# Patient Record
Sex: MALE | Race: WHITE | NOT HISPANIC OR LATINO | URBAN - METROPOLITAN AREA
[De-identification: names, ages, dates, MRNs, and addresses within clinical notes are randomized per-mention and may not be internally consistent; named-entity substitution may affect disease eponyms.]

---

## 2023-04-18 ENCOUNTER — OFFICE VISIT (OUTPATIENT)
Dept: PRIMARY CARE | Facility: CLINIC | Age: 68
End: 2023-04-18
Payer: MEDICARE

## 2023-04-18 VITALS
DIASTOLIC BLOOD PRESSURE: 72 MMHG | BODY MASS INDEX: 32.95 KG/M2 | HEIGHT: 66 IN | HEART RATE: 67 BPM | OXYGEN SATURATION: 96 % | SYSTOLIC BLOOD PRESSURE: 121 MMHG | WEIGHT: 205 LBS

## 2023-04-18 DIAGNOSIS — L98.9 SKIN LESION: ICD-10-CM

## 2023-04-18 DIAGNOSIS — E78.5 HYPERLIPIDEMIA, UNSPECIFIED HYPERLIPIDEMIA TYPE: ICD-10-CM

## 2023-04-18 DIAGNOSIS — E11.69 TYPE 2 DIABETES MELLITUS WITH OTHER SPECIFIED COMPLICATION, WITH LONG-TERM CURRENT USE OF INSULIN (MULTI): Primary | ICD-10-CM

## 2023-04-18 DIAGNOSIS — Z95.1 HX OF CABG: ICD-10-CM

## 2023-04-18 DIAGNOSIS — K59.00 CONSTIPATION, UNSPECIFIED CONSTIPATION TYPE: ICD-10-CM

## 2023-04-18 DIAGNOSIS — Z79.01 ANTICOAGULATED: ICD-10-CM

## 2023-04-18 DIAGNOSIS — J44.9 CHRONIC OBSTRUCTIVE PULMONARY DISEASE, UNSPECIFIED COPD TYPE (MULTI): ICD-10-CM

## 2023-04-18 DIAGNOSIS — Z79.4 TYPE 2 DIABETES MELLITUS WITH OTHER SPECIFIED COMPLICATION, WITH LONG-TERM CURRENT USE OF INSULIN (MULTI): Primary | ICD-10-CM

## 2023-04-18 DIAGNOSIS — R53.83 OTHER FATIGUE: ICD-10-CM

## 2023-04-18 DIAGNOSIS — I10 HYPERTENSION, UNSPECIFIED TYPE: ICD-10-CM

## 2023-04-18 DIAGNOSIS — E66.9 CLASS 1 OBESITY WITH SERIOUS COMORBIDITY AND BODY MASS INDEX (BMI) OF 33.0 TO 33.9 IN ADULT, UNSPECIFIED OBESITY TYPE: ICD-10-CM

## 2023-04-18 DIAGNOSIS — I48.0 PAROXYSMAL ATRIAL FIBRILLATION (MULTI): ICD-10-CM

## 2023-04-18 DIAGNOSIS — Z87.891 PERSONAL HISTORY OF NICOTINE DEPENDENCE: ICD-10-CM

## 2023-04-18 DIAGNOSIS — G47.33 OSA ON CPAP: ICD-10-CM

## 2023-04-18 LAB
ALANINE AMINOTRANSFERASE (SGPT) (U/L) IN SER/PLAS: 40 U/L (ref 10–52)
ALBUMIN (G/DL) IN SER/PLAS: 4.4 G/DL (ref 3.4–5)
ALBUMIN (MG/L) IN URINE: 41.9 MG/L
ALBUMIN/CREATININE (UG/MG) IN URINE: 29.5 UG/MG CRT (ref 0–30)
ALKALINE PHOSPHATASE (U/L) IN SER/PLAS: 60 U/L (ref 33–136)
ANION GAP IN SER/PLAS: 11 MMOL/L (ref 10–20)
ASPARTATE AMINOTRANSFERASE (SGOT) (U/L) IN SER/PLAS: 38 U/L (ref 9–39)
BILIRUBIN TOTAL (MG/DL) IN SER/PLAS: 1.1 MG/DL (ref 0–1.2)
CALCIUM (MG/DL) IN SER/PLAS: 9.8 MG/DL (ref 8.6–10.6)
CARBON DIOXIDE, TOTAL (MMOL/L) IN SER/PLAS: 34 MMOL/L (ref 21–32)
CHLORIDE (MMOL/L) IN SER/PLAS: 98 MMOL/L (ref 98–107)
CHOLESTEROL (MG/DL) IN SER/PLAS: 129 MG/DL (ref 0–199)
CHOLESTEROL IN HDL (MG/DL) IN SER/PLAS: 44.8 MG/DL
CHOLESTEROL/HDL RATIO: 2.9
CREATININE (MG/DL) IN SER/PLAS: 1.1 MG/DL (ref 0.5–1.3)
CREATININE (MG/DL) IN URINE: 142 MG/DL (ref 20–370)
ERYTHROCYTE DISTRIBUTION WIDTH (RATIO) BY AUTOMATED COUNT: 14.5 % (ref 11.5–14.5)
ERYTHROCYTE MEAN CORPUSCULAR HEMOGLOBIN CONCENTRATION (G/DL) BY AUTOMATED: 31.7 G/DL (ref 32–36)
ERYTHROCYTE MEAN CORPUSCULAR VOLUME (FL) BY AUTOMATED COUNT: 98 FL (ref 80–100)
ERYTHROCYTES (10*6/UL) IN BLOOD BY AUTOMATED COUNT: 5.94 X10E12/L (ref 4.5–5.9)
ESTIMATED AVERAGE GLUCOSE FOR HBA1C: 151 MG/DL
FERRITIN (UG/LL) IN SER/PLAS: 62 UG/L (ref 20–300)
GFR MALE: 73 ML/MIN/1.73M2
GLUCOSE (MG/DL) IN SER/PLAS: 102 MG/DL (ref 74–99)
HEMATOCRIT (%) IN BLOOD BY AUTOMATED COUNT: 58 % (ref 41–52)
HEMOGLOBIN (G/DL) IN BLOOD: 18.4 G/DL (ref 13.5–17.5)
HEMOGLOBIN A1C/HEMOGLOBIN TOTAL IN BLOOD: 6.9 %
IRON (UG/DL) IN SER/PLAS: 114 UG/DL (ref 35–150)
IRON BINDING CAPACITY (UG/DL) IN SER/PLAS: 438 UG/DL (ref 240–445)
IRON SATURATION (%) IN SER/PLAS: 26 % (ref 25–45)
LDL: 46 MG/DL (ref 0–99)
LEUKOCYTES (10*3/UL) IN BLOOD BY AUTOMATED COUNT: 9.7 X10E9/L (ref 4.4–11.3)
NRBC (PER 100 WBCS) BY AUTOMATED COUNT: 0 /100 WBC (ref 0–0)
PLATELETS (10*3/UL) IN BLOOD AUTOMATED COUNT: 235 X10E9/L (ref 150–450)
POTASSIUM (MMOL/L) IN SER/PLAS: 4.8 MMOL/L (ref 3.5–5.3)
PROTEIN TOTAL: 7.3 G/DL (ref 6.4–8.2)
SODIUM (MMOL/L) IN SER/PLAS: 138 MMOL/L (ref 136–145)
THYROTROPIN (MIU/L) IN SER/PLAS BY DETECTION LIMIT <= 0.05 MIU/L: 3.47 MIU/L (ref 0.44–3.98)
TRANSFERRIN (MG/DL) IN SER/PLAS: 330 MG/DL (ref 200–360)
TRIGLYCERIDE (MG/DL) IN SER/PLAS: 189 MG/DL (ref 0–149)
UREA NITROGEN (MG/DL) IN SER/PLAS: 15 MG/DL (ref 6–23)
VLDL: 38 MG/DL (ref 0–40)

## 2023-04-18 PROCEDURE — 3008F BODY MASS INDEX DOCD: CPT | Performed by: INTERNAL MEDICINE

## 2023-04-18 PROCEDURE — 1159F MED LIST DOCD IN RCRD: CPT | Performed by: INTERNAL MEDICINE

## 2023-04-18 PROCEDURE — 83036 HEMOGLOBIN GLYCOSYLATED A1C: CPT

## 2023-04-18 PROCEDURE — 85027 COMPLETE CBC AUTOMATED: CPT

## 2023-04-18 PROCEDURE — 84466 ASSAY OF TRANSFERRIN: CPT

## 2023-04-18 PROCEDURE — 80061 LIPID PANEL: CPT

## 2023-04-18 PROCEDURE — 1160F RVW MEDS BY RX/DR IN RCRD: CPT | Performed by: INTERNAL MEDICINE

## 2023-04-18 PROCEDURE — 3078F DIAST BP <80 MM HG: CPT | Performed by: INTERNAL MEDICINE

## 2023-04-18 PROCEDURE — 82570 ASSAY OF URINE CREATININE: CPT

## 2023-04-18 PROCEDURE — 80053 COMPREHEN METABOLIC PANEL: CPT

## 2023-04-18 PROCEDURE — 36415 COLL VENOUS BLD VENIPUNCTURE: CPT | Performed by: INTERNAL MEDICINE

## 2023-04-18 PROCEDURE — 82043 UR ALBUMIN QUANTITATIVE: CPT

## 2023-04-18 PROCEDURE — 83540 ASSAY OF IRON: CPT

## 2023-04-18 PROCEDURE — 82728 ASSAY OF FERRITIN: CPT

## 2023-04-18 PROCEDURE — 1036F TOBACCO NON-USER: CPT | Performed by: INTERNAL MEDICINE

## 2023-04-18 PROCEDURE — 99204 OFFICE O/P NEW MOD 45 MIN: CPT | Performed by: INTERNAL MEDICINE

## 2023-04-18 PROCEDURE — 84443 ASSAY THYROID STIM HORMONE: CPT

## 2023-04-18 PROCEDURE — 3074F SYST BP LT 130 MM HG: CPT | Performed by: INTERNAL MEDICINE

## 2023-04-18 RX ORDER — NEBIVOLOL 5 MG/1
5 TABLET ORAL DAILY
COMMUNITY
End: 2024-04-25 | Stop reason: ALTCHOICE

## 2023-04-18 RX ORDER — METFORMIN HYDROCHLORIDE 500 MG/1
500 TABLET ORAL
COMMUNITY
End: 2023-08-30 | Stop reason: SDUPTHER

## 2023-04-18 RX ORDER — CALCIUM CARBONATE 300MG(750)
400 TABLET,CHEWABLE ORAL DAILY
COMMUNITY

## 2023-04-18 RX ORDER — ALBUTEROL SULFATE 90 UG/1
2 AEROSOL, METERED RESPIRATORY (INHALATION) EVERY 6 HOURS PRN
COMMUNITY

## 2023-04-18 RX ORDER — DABIGATRAN ETEXILATE 150 MG/1
150 CAPSULE ORAL 2 TIMES DAILY
COMMUNITY
End: 2024-04-25 | Stop reason: ALTCHOICE

## 2023-04-18 RX ORDER — AMIODARONE HYDROCHLORIDE 200 MG/1
200 TABLET ORAL DAILY
COMMUNITY
End: 2024-04-25 | Stop reason: ALTCHOICE

## 2023-04-18 RX ORDER — LATANOPROST 50 UG/ML
1 SOLUTION/ DROPS OPHTHALMIC NIGHTLY
COMMUNITY

## 2023-04-18 RX ORDER — FUROSEMIDE 20 MG/1
TABLET ORAL
COMMUNITY

## 2023-04-18 RX ORDER — LINAGLIPTIN 5 MG/1
5 TABLET, FILM COATED ORAL DAILY
COMMUNITY
End: 2023-08-30 | Stop reason: SDUPTHER

## 2023-04-18 RX ORDER — VITAMIN E MIXED 400 UNIT
CAPSULE ORAL DAILY
COMMUNITY
End: 2023-09-05

## 2023-04-18 RX ORDER — ROSUVASTATIN CALCIUM 20 MG/1
20 TABLET, COATED ORAL DAILY
COMMUNITY
End: 2023-08-30 | Stop reason: SDUPTHER

## 2023-04-18 RX ORDER — ASPIRIN 81 MG/1
81 TABLET ORAL DAILY
COMMUNITY

## 2023-04-18 RX ORDER — TIOTROPIUM BROMIDE 18 UG/1
1 CAPSULE ORAL; RESPIRATORY (INHALATION)
COMMUNITY
End: 2024-03-06 | Stop reason: SDUPTHER

## 2023-04-18 RX ORDER — MULTIVITAMIN
1 TABLET ORAL DAILY
COMMUNITY

## 2023-04-18 NOTE — PROGRESS NOTES
"Subjective   Patient ID: Augusto Woodward is a 68 y.o. male who presents for Establish Care (New patient here to hospitals care).    HPI   Presents with C/O chronic fatigue, on CPAP for MAC, tolerating it well.  C/O skin lesion on the upper abdominal wall, recent eruption, non tender.   Reports history of CABG, atrial fibrillation, COPD, 30 pack year smoking history, quit 11 years prior.   Review of Systems  Fatigue   Skin lesion  Constipation    Objective   /72   Pulse 67   Ht 1.676 m (5' 6\")   Wt 93 kg (205 lb)   SpO2 96%   BMI 33.09 kg/m²     Physical Exam  NAD. Cooperative.  Skin: palpable papule, erythematous, non tender  Lungs CTA  Heart: RRR  LE: negative     Assessment/Plan   Diagnoses and all orders for this visit:  Type 2 diabetes mellitus with other specified complication, with long-term current use of insulin (CMS/HCC)  -     Albumin, urine, random; Future  -     Hemoglobin A1C; Future  Hypertension, unspecified type  -     Comprehensive metabolic panel; Future  Hyperlipidemia, unspecified hyperlipidemia type  -     Lipid panel; Future  -     TSH; Future  Other fatigue  -     CBC; Future  -     Iron and TIBC; Future  -     Ferritin; Future  -     Transferrin; Future  -     TSH; Future  Skin lesion  -     Referral to Dermatology; Future  Paroxysmal atrial fibrillation (CMS/HCC)  Constipation, unspecified constipation type  Anticoagulated  Chronic obstructive pulmonary disease, unspecified COPD type (CMS/HCC)  Personal history of nicotine dependence  -     CT lung screening low dose; Future  Class 1 obesity with serious comorbidity and body mass index (BMI) of 33.0 to 33.9 in adult, unspecified obesity type  MAC on CPAP  Hx of CABG  Discussed health benefits from whole food plant based diet. Bibliotherapy: [employer], ARISTEO Holloway, PhD, suggested         "

## 2023-04-19 PROBLEM — R80.9 ALBUMINURIA: Status: ACTIVE | Noted: 2023-04-19

## 2023-04-20 ENCOUNTER — TELEPHONE (OUTPATIENT)
Dept: PRIMARY CARE | Facility: CLINIC | Age: 68
End: 2023-04-20
Payer: MEDICARE

## 2023-08-30 DIAGNOSIS — E78.5 HYPERLIPIDEMIA, UNSPECIFIED HYPERLIPIDEMIA TYPE: ICD-10-CM

## 2023-08-30 DIAGNOSIS — E11.69 TYPE 2 DIABETES MELLITUS WITH OTHER SPECIFIED COMPLICATION, WITH LONG-TERM CURRENT USE OF INSULIN (MULTI): ICD-10-CM

## 2023-08-30 DIAGNOSIS — Z79.4 TYPE 2 DIABETES MELLITUS WITH OTHER SPECIFIED COMPLICATION, WITH LONG-TERM CURRENT USE OF INSULIN (MULTI): ICD-10-CM

## 2023-08-30 RX ORDER — METFORMIN HYDROCHLORIDE 500 MG/1
500 TABLET ORAL
Qty: 180 TABLET | Refills: 1 | Status: SHIPPED | OUTPATIENT
Start: 2023-08-30 | End: 2023-09-05

## 2023-08-30 RX ORDER — LINAGLIPTIN 5 MG/1
5 TABLET, FILM COATED ORAL DAILY
Qty: 90 TABLET | Refills: 1 | Status: SHIPPED | OUTPATIENT
Start: 2023-08-30 | End: 2023-11-27

## 2023-08-30 RX ORDER — ROSUVASTATIN CALCIUM 20 MG/1
20 TABLET, COATED ORAL DAILY
Qty: 90 TABLET | Refills: 1 | Status: SHIPPED | OUTPATIENT
Start: 2023-08-30 | End: 2023-11-06

## 2023-09-01 ENCOUNTER — TELEPHONE (OUTPATIENT)
Dept: PRIMARY CARE | Facility: CLINIC | Age: 68
End: 2023-09-01
Payer: MEDICARE

## 2023-09-05 DIAGNOSIS — E11.9 TYPE 2 DIABETES MELLITUS WITHOUT COMPLICATION, WITHOUT LONG-TERM CURRENT USE OF INSULIN (MULTI): Primary | ICD-10-CM

## 2023-09-05 PROBLEM — E78.49 OTHER HYPERLIPIDEMIA: Status: ACTIVE | Noted: 2023-09-05

## 2023-09-05 RX ORDER — METFORMIN HYDROCHLORIDE 500 MG/1
1000 TABLET, EXTENDED RELEASE ORAL
Qty: 180 TABLET | Refills: 3 | Status: SHIPPED | OUTPATIENT
Start: 2023-09-05 | End: 2024-09-04

## 2023-10-17 ENCOUNTER — OFFICE VISIT (OUTPATIENT)
Dept: PRIMARY CARE | Facility: CLINIC | Age: 68
End: 2023-10-17
Payer: MEDICARE

## 2023-10-17 VITALS
SYSTOLIC BLOOD PRESSURE: 140 MMHG | HEART RATE: 64 BPM | BODY MASS INDEX: 32.15 KG/M2 | HEIGHT: 65 IN | DIASTOLIC BLOOD PRESSURE: 63 MMHG | WEIGHT: 193 LBS | OXYGEN SATURATION: 97 %

## 2023-10-17 DIAGNOSIS — K59.00 CONSTIPATION, UNSPECIFIED CONSTIPATION TYPE: ICD-10-CM

## 2023-10-17 DIAGNOSIS — R80.9 ALBUMINURIA: ICD-10-CM

## 2023-10-17 DIAGNOSIS — Z00.00 MEDICARE ANNUAL WELLNESS VISIT, SUBSEQUENT: Primary | ICD-10-CM

## 2023-10-17 DIAGNOSIS — Z12.5 SCREENING PSA (PROSTATE SPECIFIC ANTIGEN): ICD-10-CM

## 2023-10-17 DIAGNOSIS — E11.9 TYPE 2 DIABETES MELLITUS WITHOUT COMPLICATION, WITHOUT LONG-TERM CURRENT USE OF INSULIN (MULTI): ICD-10-CM

## 2023-10-17 DIAGNOSIS — R71.0 HEMOGLOBIN DROP: ICD-10-CM

## 2023-10-17 DIAGNOSIS — R03.0 ELEVATED BP WITHOUT DIAGNOSIS OF HYPERTENSION: ICD-10-CM

## 2023-10-17 DIAGNOSIS — Z23 IMMUNIZATION DUE: ICD-10-CM

## 2023-10-17 DIAGNOSIS — E78.49 OTHER HYPERLIPIDEMIA: ICD-10-CM

## 2023-10-17 DIAGNOSIS — Z12.11 ENCOUNTER FOR SCREENING FOR MALIGNANT NEOPLASM OF COLON: ICD-10-CM

## 2023-10-17 DIAGNOSIS — M70.41 PREPATELLAR BURSITIS OF RIGHT KNEE: ICD-10-CM

## 2023-10-17 DIAGNOSIS — Z11.59 NEED FOR HEPATITIS C SCREENING TEST: ICD-10-CM

## 2023-10-17 DIAGNOSIS — R79.89 ABNORMAL TSH: Primary | ICD-10-CM

## 2023-10-17 LAB
ALBUMIN SERPL BCP-MCNC: 4.5 G/DL (ref 3.4–5)
ALP SERPL-CCNC: 71 U/L (ref 33–136)
ALT SERPL W P-5'-P-CCNC: 31 U/L (ref 10–52)
ANION GAP SERPL CALC-SCNC: 12 MMOL/L (ref 10–20)
AST SERPL W P-5'-P-CCNC: 24 U/L (ref 9–39)
BILIRUB SERPL-MCNC: 1 MG/DL (ref 0–1.2)
BUN SERPL-MCNC: 12 MG/DL (ref 6–23)
CALCIUM SERPL-MCNC: 9.9 MG/DL (ref 8.6–10.6)
CHLORIDE SERPL-SCNC: 101 MMOL/L (ref 98–107)
CHOLEST SERPL-MCNC: 129 MG/DL (ref 0–199)
CHOLESTEROL/HDL RATIO: 2.6
CO2 SERPL-SCNC: 31 MMOL/L (ref 21–32)
CREAT SERPL-MCNC: 0.85 MG/DL (ref 0.5–1.3)
ERYTHROCYTE [DISTWIDTH] IN BLOOD BY AUTOMATED COUNT: 14.8 % (ref 11.5–14.5)
EST. AVERAGE GLUCOSE BLD GHB EST-MCNC: 134 MG/DL
GFR SERPL CREATININE-BSD FRML MDRD: >90 ML/MIN/1.73M*2
GLUCOSE SERPL-MCNC: 97 MG/DL (ref 74–99)
HBA1C MFR BLD: 6.3 %
HCT VFR BLD AUTO: 53.6 % (ref 41–52)
HCV AB SER QL: NONREACTIVE
HDLC SERPL-MCNC: 48.7 MG/DL
HGB BLD-MCNC: 17.4 G/DL (ref 13.5–17.5)
LDLC SERPL CALC-MCNC: 51 MG/DL
MCH RBC QN AUTO: 31.4 PG (ref 26–34)
MCHC RBC AUTO-ENTMCNC: 32.5 G/DL (ref 32–36)
MCV RBC AUTO: 97 FL (ref 80–100)
NON HDL CHOLESTEROL: 80 MG/DL (ref 0–149)
NRBC BLD-RTO: 0 /100 WBCS (ref 0–0)
PLATELET # BLD AUTO: 204 X10*3/UL (ref 150–450)
PMV BLD AUTO: 12.1 FL (ref 7.5–11.5)
POTASSIUM SERPL-SCNC: 4.4 MMOL/L (ref 3.5–5.3)
PROT SERPL-MCNC: 7.6 G/DL (ref 6.4–8.2)
PSA SERPL-MCNC: 0.79 NG/ML
RBC # BLD AUTO: 5.55 X10*6/UL (ref 4.5–5.9)
SODIUM SERPL-SCNC: 140 MMOL/L (ref 136–145)
TRIGL SERPL-MCNC: 147 MG/DL (ref 0–149)
TSH SERPL-ACNC: 0.06 MIU/L (ref 0.44–3.98)
VLDL: 29 MG/DL (ref 0–40)
WBC # BLD AUTO: 9.1 X10*3/UL (ref 4.4–11.3)

## 2023-10-17 PROCEDURE — G0439 PPPS, SUBSEQ VISIT: HCPCS | Performed by: INTERNAL MEDICINE

## 2023-10-17 PROCEDURE — 90662 IIV NO PRSV INCREASED AG IM: CPT | Performed by: INTERNAL MEDICINE

## 2023-10-17 PROCEDURE — 82570 ASSAY OF URINE CREATININE: CPT

## 2023-10-17 PROCEDURE — 1160F RVW MEDS BY RX/DR IN RCRD: CPT | Performed by: INTERNAL MEDICINE

## 2023-10-17 PROCEDURE — 1159F MED LIST DOCD IN RCRD: CPT | Performed by: INTERNAL MEDICINE

## 2023-10-17 PROCEDURE — 84443 ASSAY THYROID STIM HORMONE: CPT

## 2023-10-17 PROCEDURE — 3008F BODY MASS INDEX DOCD: CPT | Performed by: INTERNAL MEDICINE

## 2023-10-17 PROCEDURE — 86803 HEPATITIS C AB TEST: CPT

## 2023-10-17 PROCEDURE — 3078F DIAST BP <80 MM HG: CPT | Performed by: INTERNAL MEDICINE

## 2023-10-17 PROCEDURE — G0008 ADMIN INFLUENZA VIRUS VAC: HCPCS | Performed by: INTERNAL MEDICINE

## 2023-10-17 PROCEDURE — 83036 HEMOGLOBIN GLYCOSYLATED A1C: CPT

## 2023-10-17 PROCEDURE — 82043 UR ALBUMIN QUANTITATIVE: CPT

## 2023-10-17 PROCEDURE — 90677 PCV20 VACCINE IM: CPT | Performed by: INTERNAL MEDICINE

## 2023-10-17 PROCEDURE — 1170F FXNL STATUS ASSESSED: CPT | Performed by: INTERNAL MEDICINE

## 2023-10-17 PROCEDURE — 80053 COMPREHEN METABOLIC PANEL: CPT

## 2023-10-17 PROCEDURE — 1036F TOBACCO NON-USER: CPT | Performed by: INTERNAL MEDICINE

## 2023-10-17 PROCEDURE — 84153 ASSAY OF PSA TOTAL: CPT

## 2023-10-17 PROCEDURE — 3044F HG A1C LEVEL LT 7.0%: CPT | Performed by: INTERNAL MEDICINE

## 2023-10-17 PROCEDURE — 36415 COLL VENOUS BLD VENIPUNCTURE: CPT

## 2023-10-17 PROCEDURE — 80061 LIPID PANEL: CPT

## 2023-10-17 PROCEDURE — 85027 COMPLETE CBC AUTOMATED: CPT

## 2023-10-17 PROCEDURE — 3077F SYST BP >= 140 MM HG: CPT | Performed by: INTERNAL MEDICINE

## 2023-10-17 PROCEDURE — G0009 ADMIN PNEUMOCOCCAL VACCINE: HCPCS | Performed by: INTERNAL MEDICINE

## 2023-10-17 ASSESSMENT — PATIENT HEALTH QUESTIONNAIRE - PHQ9
1. LITTLE INTEREST OR PLEASURE IN DOING THINGS: NOT AT ALL
SUM OF ALL RESPONSES TO PHQ9 QUESTIONS 1 AND 2: 0
2. FEELING DOWN, DEPRESSED OR HOPELESS: NOT AT ALL

## 2023-10-17 ASSESSMENT — ACTIVITIES OF DAILY LIVING (ADL)
DOING_HOUSEWORK: INDEPENDENT
MANAGING_FINANCES: INDEPENDENT
GROCERY_SHOPPING: INDEPENDENT
BATHING: INDEPENDENT
TAKING_MEDICATION: INDEPENDENT
DRESSING: INDEPENDENT

## 2023-10-17 NOTE — PROGRESS NOTES
"Subjective   Patient ID: Augusto Woodward is a 68 y.o. male who presents for Medicare Annual Wellness Visit Subsequent (Patient here for medicare wellness visit ).    HPI   C/O right knee pain and swelling, \"I fell 4 days ago\", tripped in his yard.    Review of Systems  constipation  Knee pain, swelling  Objective   /63   Pulse 64   Ht 1.65 m (5' 4.96\")   Wt 87.5 kg (193 lb)   SpO2 97%   BMI 32.16 kg/m²     Physical Exam  NAD. Cooperative.  HEENT: WNL  Neck: WNL  Lungs CTA  Heart: RRR  Abdomen: WNL  Musculoskeletal system: Knee exam reveals a mild prepatellar tenderness, palpable round nodule, ROM WNL,   Neurologic exam: WNL    Assessment/Plan   Diagnoses and all orders for this visit:  Medicare annual wellness visit, subsequent  -     Comprehensive metabolic panel; Future  Encounter for screening for malignant neoplasm of colon  -     Colonoscopy Screening; Future  -     CT cardiac scoring wo IV contrast; Future  Need for hepatitis C screening test  -     Hepatitis C Antibody; Future  Type 2 diabetes mellitus without complication, without long-term current use of insulin (CMS/[facility])  -     Hemoglobin A1C; Future  -     CT cardiac scoring wo IV contrast; Future  Other hyperlipidemia  -     TSH; Future  -     Lipid panel; Future  -     CT cardiac scoring wo IV contrast; Future  Albuminuria  -     Albumin, urine, random; Future  Hemoglobin drop  -     CBC; Future  Screening PSA (prostate specific antigen)  -     Prostate Spec.Ag,Screen; Future  Immunization due  -     Pneumococcal conjugate vaccine, 20-valent (PREVNAR 20)  -     Flu vaccine, quadrivalent, high-dose, preservative free, age 65y+ (FLUZONE)  Constipation, unspecified constipation type  Prepatellar bursitis of right knee  Elevated BP without diagnosis of hypertension  Recommended Tylenol 650 mg tid PRN, cold compress, PRN follow up.  BP monitoring, 3 months.  Weight reduction, DASH eating plan.  Recommended Lynda Lax OTC, hydration, dairy free " diet.  Discussed and recommended  RSV vaccine, COVID and Tdap boosters, postponed.

## 2023-10-18 LAB
CREAT UR-MCNC: 114.4 MG/DL (ref 20–370)
MICROALBUMIN UR-MCNC: 35.5 MG/L
MICROALBUMIN/CREAT UR: 31 UG/MG CREAT

## 2023-11-04 DIAGNOSIS — E78.5 HYPERLIPIDEMIA, UNSPECIFIED HYPERLIPIDEMIA TYPE: ICD-10-CM

## 2023-11-06 RX ORDER — ROSUVASTATIN CALCIUM 20 MG/1
20 TABLET, COATED ORAL DAILY
Qty: 100 TABLET | Refills: 3 | Status: SHIPPED | OUTPATIENT
Start: 2023-11-06

## 2023-11-23 DIAGNOSIS — Z79.4 TYPE 2 DIABETES MELLITUS WITH OTHER SPECIFIED COMPLICATION, WITH LONG-TERM CURRENT USE OF INSULIN (MULTI): ICD-10-CM

## 2023-11-23 DIAGNOSIS — E11.69 TYPE 2 DIABETES MELLITUS WITH OTHER SPECIFIED COMPLICATION, WITH LONG-TERM CURRENT USE OF INSULIN (MULTI): ICD-10-CM

## 2023-11-24 ENCOUNTER — LAB (OUTPATIENT)
Dept: LAB | Facility: LAB | Age: 68
End: 2023-11-24
Payer: MEDICARE

## 2023-11-24 DIAGNOSIS — R79.89 ABNORMAL TSH: ICD-10-CM

## 2023-11-24 LAB
T4 FREE SERPL-MCNC: 2.86 NG/DL (ref 0.78–1.48)
TSH SERPL-ACNC: 0.01 MIU/L (ref 0.44–3.98)

## 2023-11-24 PROCEDURE — 84443 ASSAY THYROID STIM HORMONE: CPT

## 2023-11-24 PROCEDURE — 84439 ASSAY OF FREE THYROXINE: CPT

## 2023-11-24 PROCEDURE — 36415 COLL VENOUS BLD VENIPUNCTURE: CPT

## 2023-11-25 DIAGNOSIS — E21.3 HYPERPARATHYROIDISM (MULTI): Primary | ICD-10-CM

## 2023-11-27 ENCOUNTER — TELEPHONE (OUTPATIENT)
Dept: PRIMARY CARE | Facility: CLINIC | Age: 68
End: 2023-11-27
Payer: MEDICARE

## 2023-11-27 RX ORDER — LINAGLIPTIN 5 MG/1
5 TABLET, FILM COATED ORAL DAILY
Qty: 100 TABLET | Refills: 2 | Status: SHIPPED | OUTPATIENT
Start: 2023-11-27

## 2024-03-06 DIAGNOSIS — J44.9 CHRONIC OBSTRUCTIVE PULMONARY DISEASE, UNSPECIFIED COPD TYPE (MULTI): ICD-10-CM

## 2024-03-06 RX ORDER — TIOTROPIUM BROMIDE 18 UG/1
1 CAPSULE ORAL; RESPIRATORY (INHALATION)
Qty: 90 CAPSULE | Refills: 1 | Status: SHIPPED | OUTPATIENT
Start: 2024-03-06

## 2024-04-11 ENCOUNTER — TELEPHONE (OUTPATIENT)
Dept: PRIMARY CARE | Facility: CLINIC | Age: 69
End: 2024-04-11
Payer: MEDICARE

## 2024-04-11 DIAGNOSIS — G47.30 SLEEP APNEA, UNSPECIFIED TYPE: Primary | ICD-10-CM

## 2024-04-25 ENCOUNTER — OFFICE VISIT (OUTPATIENT)
Dept: PRIMARY CARE | Facility: CLINIC | Age: 69
End: 2024-04-25
Payer: MEDICARE

## 2024-04-25 VITALS
OXYGEN SATURATION: 97 % | WEIGHT: 178 LBS | BODY MASS INDEX: 29.66 KG/M2 | HEART RATE: 80 BPM | DIASTOLIC BLOOD PRESSURE: 77 MMHG | SYSTOLIC BLOOD PRESSURE: 129 MMHG

## 2024-04-25 DIAGNOSIS — Z23 IMMUNIZATION DUE: Primary | ICD-10-CM

## 2024-04-25 DIAGNOSIS — Z79.4 TYPE 2 DIABETES MELLITUS WITH OTHER SPECIFIED COMPLICATION, WITH LONG-TERM CURRENT USE OF INSULIN (MULTI): ICD-10-CM

## 2024-04-25 DIAGNOSIS — J44.9 CHRONIC OBSTRUCTIVE PULMONARY DISEASE, UNSPECIFIED COPD TYPE (MULTI): ICD-10-CM

## 2024-04-25 DIAGNOSIS — E11.69 TYPE 2 DIABETES MELLITUS WITH OTHER SPECIFIED COMPLICATION, WITH LONG-TERM CURRENT USE OF INSULIN (MULTI): ICD-10-CM

## 2024-04-25 DIAGNOSIS — I10 HYPERTENSION, UNSPECIFIED TYPE: ICD-10-CM

## 2024-04-25 DIAGNOSIS — E11.9 TYPE 2 DIABETES MELLITUS WITHOUT COMPLICATION, WITHOUT LONG-TERM CURRENT USE OF INSULIN (MULTI): ICD-10-CM

## 2024-04-25 PROCEDURE — 1160F RVW MEDS BY RX/DR IN RCRD: CPT | Performed by: INTERNAL MEDICINE

## 2024-04-25 PROCEDURE — 1159F MED LIST DOCD IN RCRD: CPT | Performed by: INTERNAL MEDICINE

## 2024-04-25 PROCEDURE — 90471 IMMUNIZATION ADMIN: CPT | Performed by: INTERNAL MEDICINE

## 2024-04-25 PROCEDURE — 1036F TOBACCO NON-USER: CPT | Performed by: INTERNAL MEDICINE

## 2024-04-25 PROCEDURE — 99214 OFFICE O/P EST MOD 30 MIN: CPT | Performed by: INTERNAL MEDICINE

## 2024-04-25 PROCEDURE — 90715 TDAP VACCINE 7 YRS/> IM: CPT | Performed by: INTERNAL MEDICINE

## 2024-04-25 PROCEDURE — 3074F SYST BP LT 130 MM HG: CPT | Performed by: INTERNAL MEDICINE

## 2024-04-25 PROCEDURE — 3078F DIAST BP <80 MM HG: CPT | Performed by: INTERNAL MEDICINE

## 2024-04-25 RX ORDER — METHIMAZOLE 10 MG/1
10 TABLET ORAL 3 TIMES DAILY
COMMUNITY

## 2024-04-25 RX ORDER — DILTIAZEM HYDROCHLORIDE 120 MG/1
120 CAPSULE, COATED, EXTENDED RELEASE ORAL DAILY
COMMUNITY
Start: 2024-04-25

## 2024-04-25 RX ORDER — METOPROLOL TARTRATE 25 MG/1
25 TABLET, FILM COATED ORAL 3 TIMES DAILY
COMMUNITY
Start: 2024-04-25

## 2024-04-25 NOTE — PROGRESS NOTES
Subjective   Patient ID: Augusto Woodward is a 69 y.o. male who presents for Follow-up (Patient here for follow-up visit ).    HPI   The patient presents for a periodic follow up of DM, tolerating the current medication regimen well.    Review of Systems  Weight loss  Objective   /77   Pulse 80   Wt 80.7 kg (178 lb)   SpO2 97%   BMI 29.66 kg/m²     Physical Exam  NAD. Cooperative.  Neck: auscultated right sided carotid bruit  Lungs CTA  Heart: RRR  LE: negative     Assessment/Plan   Diagnoses and all orders for this visit:  Type 2 diabetes mellitus without complication, without long-term current use of insulin (Multi)  -     Hemoglobin A1C; Future  Immunization due  -     diphth,pertus,acell,,tetanus (BoostRIX) 2.5-8-5 Lf-mcg-Lf/0.5mL injection; Inject 0.5 mL into the muscle 1 time for 1 dose.  Hypertension, unspecified type  Reviewed and updated in the chart.  Reviewed 4/24 lab results and the records, discussed with the patient.

## 2024-07-26 DIAGNOSIS — E11.69 TYPE 2 DIABETES MELLITUS WITH OTHER SPECIFIED COMPLICATION, WITH LONG-TERM CURRENT USE OF INSULIN (MULTI): ICD-10-CM

## 2024-07-26 DIAGNOSIS — Z79.4 TYPE 2 DIABETES MELLITUS WITH OTHER SPECIFIED COMPLICATION, WITH LONG-TERM CURRENT USE OF INSULIN (MULTI): ICD-10-CM

## 2024-07-29 RX ORDER — LINAGLIPTIN 5 MG/1
5 TABLET, FILM COATED ORAL DAILY
Qty: 100 TABLET | Refills: 2 | Status: SHIPPED | OUTPATIENT
Start: 2024-07-29

## 2024-09-11 ENCOUNTER — APPOINTMENT (OUTPATIENT)
Dept: SLEEP MEDICINE | Facility: CLINIC | Age: 69
End: 2024-09-11
Payer: MEDICARE

## 2024-09-11 VITALS
HEIGHT: 65 IN | OXYGEN SATURATION: 94 % | DIASTOLIC BLOOD PRESSURE: 79 MMHG | BODY MASS INDEX: 29.99 KG/M2 | WEIGHT: 180 LBS | HEART RATE: 61 BPM | SYSTOLIC BLOOD PRESSURE: 150 MMHG

## 2024-09-11 DIAGNOSIS — G47.33 OSA (OBSTRUCTIVE SLEEP APNEA): Primary | ICD-10-CM

## 2024-09-11 DIAGNOSIS — I48.91 ATRIAL FIBRILLATION, UNSPECIFIED TYPE (MULTI): ICD-10-CM

## 2024-09-11 DIAGNOSIS — I10 HYPERTENSION, UNSPECIFIED TYPE: ICD-10-CM

## 2024-09-11 DIAGNOSIS — G47.30 SLEEP APNEA, UNSPECIFIED TYPE: ICD-10-CM

## 2024-09-11 PROCEDURE — 1159F MED LIST DOCD IN RCRD: CPT | Performed by: STUDENT IN AN ORGANIZED HEALTH CARE EDUCATION/TRAINING PROGRAM

## 2024-09-11 PROCEDURE — 99204 OFFICE O/P NEW MOD 45 MIN: CPT | Performed by: STUDENT IN AN ORGANIZED HEALTH CARE EDUCATION/TRAINING PROGRAM

## 2024-09-11 PROCEDURE — 1036F TOBACCO NON-USER: CPT | Performed by: STUDENT IN AN ORGANIZED HEALTH CARE EDUCATION/TRAINING PROGRAM

## 2024-09-11 PROCEDURE — 3008F BODY MASS INDEX DOCD: CPT | Performed by: STUDENT IN AN ORGANIZED HEALTH CARE EDUCATION/TRAINING PROGRAM

## 2024-09-11 PROCEDURE — 1160F RVW MEDS BY RX/DR IN RCRD: CPT | Performed by: STUDENT IN AN ORGANIZED HEALTH CARE EDUCATION/TRAINING PROGRAM

## 2024-09-11 PROCEDURE — 3077F SYST BP >= 140 MM HG: CPT | Performed by: STUDENT IN AN ORGANIZED HEALTH CARE EDUCATION/TRAINING PROGRAM

## 2024-09-11 PROCEDURE — 3078F DIAST BP <80 MM HG: CPT | Performed by: STUDENT IN AN ORGANIZED HEALTH CARE EDUCATION/TRAINING PROGRAM

## 2024-09-11 PROCEDURE — G2211 COMPLEX E/M VISIT ADD ON: HCPCS | Performed by: STUDENT IN AN ORGANIZED HEALTH CARE EDUCATION/TRAINING PROGRAM

## 2024-09-11 ASSESSMENT — ENCOUNTER SYMPTOMS
CARDIOVASCULAR NEGATIVE: 1
NEUROLOGICAL NEGATIVE: 1
RESPIRATORY NEGATIVE: 1
CONSTITUTIONAL NEGATIVE: 1
PSYCHIATRIC NEGATIVE: 1

## 2024-09-11 ASSESSMENT — SLEEP AND FATIGUE QUESTIONNAIRES
HOW LIKELY ARE YOU TO NOD OFF OR FALL ASLEEP WHILE WATCHING TV: MODERATE CHANCE OF DOZING
SLEEP_PROBLEM_NOTICEABLE_TO_OTHERS: SOMEWHAT
HOW LIKELY ARE YOU TO NOD OFF OR FALL ASLEEP WHILE SITTING QUIETLY AFTER LUNCH WITHOUT ALCOHOL: WOULD NEVER DOZE
HOW LIKELY ARE YOU TO NOD OFF OR FALL ASLEEP WHILE SITTING AND READING: MODERATE CHANCE OF DOZING
HOW LIKELY ARE YOU TO NOD OFF OR FALL ASLEEP WHILE LYING DOWN TO REST IN THE AFTERNOON WHEN CIRCUMSTANCES PERMIT: WOULD NEVER DOZE
SITING INACTIVE IN A PUBLIC PLACE LIKE A CLASS ROOM OR A MOVIE THEATER: WOULD NEVER DOZE
DIFFICULTY_STAYING_ASLEEP: MILD
SLEEP_PROBLEM_INTERFERES_DAILY_ACTIVITIES: SOMEWHAT
WORRIED_DISTRESSED_DUE_TO_SLEEP: NOT AT ALL NOTICEABLE
ESS-CHAD TOTAL SCORE: 4
HOW LIKELY ARE YOU TO NOD OFF OR FALL ASLEEP IN A CAR, WHILE STOPPED FOR A FEW MINUTES IN TRAFFIC: WOULD NEVER DOZE
DIFFICULTY_FALLING_ASLEEP: MILD
SATISFACTION_WITH_CURRENT_SLEEP_PATTERN: VERY SATISFIED
HOW LIKELY ARE YOU TO NOD OFF OR FALL ASLEEP WHEN YOU ARE A PASSENGER IN A CAR FOR AN HOUR WITHOUT A BREAK: WOULD NEVER DOZE
HOW LIKELY ARE YOU TO NOD OFF OR FALL ASLEEP WHILE SITTING AND TALKING TO SOMEONE: WOULD NEVER DOZE

## 2024-09-11 NOTE — PROGRESS NOTES
Patient: Augusto Woodward    [id number]  : 1955 -- AGE 69 y.o.    Provider: Claudio Walker MD     Location [facility]   Service Date: 2024              [facility]  New Visit Note    ASSESSMENT/PLAN     Mr. Woodward is a 69 y.o. male and He was referred to the [facility] for evaluation of problems listed below on 2024      Problem List, Orders, Assessment, Recommendations:  Problem List Items Addressed This Visit             ICD-10-CM    Atrial fibrillation (Multi) I48.91     In SR today on PE  Continue to follow-up with PCP and cardiology         MAC (obstructive sleep apnea) - Primary G47.33     Have been on CPAP for 13 years.  Due to COPD, he also has oxygen bleeding into the CPAP circuit at night  Sleep study in the past done via [place]  DME is [facility]  We will request download and review and adjust setting if necessary  Renew supply order for both CPAP and Oxygen (2 LPM)         Relevant Orders    Positive Airway Pressure (PAP) Therapy    HTN (hypertension) I10     BP Readings from Last 1 Encounters:   24 150/79     - BP mildly elevated today but asymptomatic  - discussed at length the impact of untreated MAC and BP control  - continue current management and follow-up with PCP           Other Visit Diagnoses         Codes    Sleep apnea, unspecified type     G47.30            Disposition    Return to clinic in 12 months         HISTORY OF PRESENT ILLNESS     The patient's referring provider is: Ebony Cha, *; Ebony Cha MD    HISTORY OF PRESENT ILLNESS   Augusto Woodward is a 69 y.o. male with h/o Hypertension, MAC, and a-fib  who presents to a [facility] for a sleep medicine evaluation with concerns of Needs Pap Supplies/new Pap Machine.     Past Sleep History  Patient has the following sleep-related diagnoses: obstructive sleep apnea. Prior sleep study results:  significant for MAC at [facility]     Current History    On today's visit, the patient reports that he needs a new sleep provider to help to take care of his CPAP supplies, prescriptions, as well as O2.  He has been on CPAP for about 13 years and have 2 LPM O2 bleeding in due to COPD.  He is doing well with CPAP.  He likes using it and feels like he definitely sleeps better with it.    Sleep schedule:      Weekdays / Work Days Weekends / Days Off   Bedtime 12 am  same as weekdays   Sleep latency 5 min same as weekdays   Wake time 8 am  same as weekdays   Total sleep time  Average/day:  Total sleep time  8 hours/day Same as weekdays   Naps Yes, for 7times per week for 45min. Naps are not refreshing   Nocturnal awakenings Yes, about 1 times a night     Preferred sleeping position: SLEEP POSITION: sidelying    Sleep-related ROS:    Sleep Initiation: no problems going to sleep  Problems going to sleep associated with: No problems going to sleep    Sleep Maintenance: wakes up about 1 times per night and easily returns to sleep after awakening  Problems staying asleep associated with: Problems Staying Asleep: nocturia    Breathing during sleep: no symptoms of snoring or concerns of breathing at night while on CPAP    RLS screen:  RLSSCREEN: - Sensations: Patient does not have unusual sensations in their extremities that cause an urge to move them     Daytime Symptoms  On awakening patient reports: no morning symptoms while on CPAP    Patient reports DAYTIME SYMPTOMS: no daytime symptoms  Patient denies daytime symptoms including: Denies: excessive daytime sleepiness  Fatigue: denies feeling fatigue    ESS: 4  CÉSAR: 6  FOSQ: 38      REVIEW OF SYSTEMS     REVIEW OF SYSTEMS  Review of Systems   Constitutional: Negative.    HENT: Negative.     Respiratory: Negative.     Cardiovascular: Negative.    Genitourinary: Negative.    Skin: Negative.    Neurological: Negative.    Psychiatric/Behavioral: Negative.       SLEEP  ROS: snoring      ALLERGIES AND MEDICATIONS     ALLERGIES  No Known Allergies    MEDICATIONS  Current Outpatient Medications   Medication Sig Dispense Refill    albuterol 90 mcg/actuation inhaler Inhale 2 puffs every 6 hours if needed for wheezing.      apixaban (Eliquis) 5 mg tablet Take 1 tablet (5 mg) by mouth twice a day.      aspirin 81 mg EC tablet Take 1 tablet (81 mg) by mouth once daily.      dilTIAZem CD (Cardizem CD) 120 mg 24 hr capsule Take 1 capsule (120 mg) by mouth once daily.      furosemide (Lasix) 20 mg tablet Take by mouth.      latanoprost (Xalatan) 0.005 % ophthalmic solution 1 drop once daily at bedtime.      magnesium oxide (Mag-Ox) 400 mg tablet 1 tablet (400 mg) once daily.      methIMAzole (Tapazole) 10 mg tablet Take 1 tablet (10 mg) by mouth 3 times a day.      metoprolol tartrate (Lopressor) 25 mg tablet Take 1 tablet (25 mg) by mouth 3 times a day.      multivitamin tablet Take 1 tablet by mouth once daily.      rosuvastatin (Crestor) 20 mg tablet TAKE 1 TABLET BY MOUTH ONCE  DAILY 100 tablet 3    tiotropium (Spiriva) 18 mcg inhalation capsule Place 1 capsule (18 mcg) into inhaler and inhale once daily. 90 capsule 1    Tradjenta 5 mg tablet TAKE 1 TABLET BY MOUTH ONCE  DAILY 100 tablet 2    metFORMIN XR (Glucophage-XR) 500 mg 24 hr tablet Take 2 tablets (1,000 mg) by mouth once daily in the evening. Take with meals. Do not crush, chew, or split. 180 tablet 3     No current facility-administered medications for this visit.         PAST HISTORY     PAST MEDICAL HISTORY  He  has no past medical history on file.    PAST SURGICAL HISTORY:  History reviewed. No pertinent surgical history.    FAMILY HISTORY  No family history on file.    He does have a family history of sleep disorder.    SOCIAL HISTORY  He  reports that he has never smoked. He has never been exposed to tobacco smoke. He has never used smokeless tobacco. He reports current alcohol use. He reports that he does not use drugs.  "He currently lives with his wife.     Caffeine consumption: Yes, 1 cups/day  Alcohol consumption: Yes, rarely (occasional)  Smoking: No  Marijuana: No      PHYSICAL EXAM     VITAL SIGNS: /79   Pulse 61   Ht 1.65 m (5' 4.96\")   Wt 81.6 kg (180 lb)   SpO2 94%   BMI 29.99 kg/m²      CURRENT WEIGHT:   Vitals:    09/11/24 0858   Weight: 81.6 kg (180 lb)     Body mass index is 29.99 kg/m².  PREVIOUS WEIGHTS:  Wt Readings from Last 3 Encounters:   09/11/24 81.6 kg (180 lb)   04/25/24 80.7 kg (178 lb)   10/17/23 87.5 kg (193 lb)       Physical Exam  Vitals reviewed.   Constitutional:       General: He is not in acute distress.     Appearance: Normal appearance. He is well-developed and normal weight.   HENT:      Head: Normocephalic and atraumatic.      Nose: Nose normal. No congestion or rhinorrhea.      Mouth/Throat:      Mouth: Mucous membranes are moist.      Pharynx: Oropharynx is clear. No oropharyngeal exudate.   Eyes:      General: No scleral icterus.     Extraocular Movements: Extraocular movements intact.      Conjunctiva/sclera: Conjunctivae normal.      Pupils: Pupils are equal, round, and reactive to light.   Neck:      Thyroid: No thyroid mass or thyroid tenderness.      Vascular: No JVD.   Cardiovascular:      Rate and Rhythm: Normal rate.      Pulses: Normal pulses.   Pulmonary:      Effort: Pulmonary effort is normal. No respiratory distress.   Musculoskeletal:      Cervical back: Normal range of motion and neck supple. No rigidity or tenderness.   Lymphadenopathy:      Cervical: No cervical adenopathy.   Neurological:      Mental Status: He is alert and oriented to person, place, and time.   Psychiatric:         Mood and Affect: Mood normal.         Behavior: Behavior normal.         Thought Content: Thought content normal.       PHYSICAL EXAM: MODIFIED MALLAMPATI SCORE: III (soft and hard palate and base of uvula visible)  TONGUE SCALLOPING: without scalloping      RESULTS/DATA     Bicarbonate " (mmol/L)   Date Value   10/17/2023 31   04/18/2023 34 (H)     Iron (ug/dL)   Date Value   04/18/2023 114     Transferrin (mg/dL)   Date Value   04/18/2023 330     Iron Saturation (%)   Date Value   04/18/2023 26     TIBC (ug/dL)   Date Value   04/18/2023 438     Ferritin (ug/L)   Date Value   04/18/2023 62             PAP Adherence  Not applicable

## 2024-09-11 NOTE — ASSESSMENT & PLAN NOTE
Have been on CPAP for 13 years.  Due to COPD, he also has oxygen bleeding into the CPAP circuit at night  Sleep study in the past done via [place]  DME is [facility]  We will request download and review and adjust setting if necessary  Renew supply order for both CPAP and Oxygen (2 LPM)

## 2024-09-11 NOTE — ASSESSMENT & PLAN NOTE
BP Readings from Last 1 Encounters:   09/11/24 150/79     - BP mildly elevated today but asymptomatic  - discussed at length the impact of untreated MAC and BP control  - continue current management and follow-up with PCP

## 2024-09-20 DIAGNOSIS — E11.9 TYPE 2 DIABETES MELLITUS WITHOUT COMPLICATION, WITHOUT LONG-TERM CURRENT USE OF INSULIN (MULTI): ICD-10-CM

## 2024-09-23 RX ORDER — METFORMIN HYDROCHLORIDE 500 MG/1
TABLET, EXTENDED RELEASE ORAL
Qty: 200 TABLET | Refills: 0 | Status: SHIPPED | OUTPATIENT
Start: 2024-09-23

## 2024-10-25 ENCOUNTER — APPOINTMENT (OUTPATIENT)
Dept: PRIMARY CARE | Facility: CLINIC | Age: 69
End: 2024-10-25
Payer: MEDICARE

## 2024-10-26 DIAGNOSIS — J44.9 CHRONIC OBSTRUCTIVE PULMONARY DISEASE, UNSPECIFIED COPD TYPE (MULTI): ICD-10-CM

## 2024-10-28 RX ORDER — TIOTROPIUM BROMIDE 18 UG/1
1 CAPSULE ORAL; RESPIRATORY (INHALATION)
Qty: 90 CAPSULE | Refills: 1 | Status: SHIPPED | OUTPATIENT
Start: 2024-10-28

## 2024-10-30 ENCOUNTER — APPOINTMENT (OUTPATIENT)
Dept: PRIMARY CARE | Facility: CLINIC | Age: 69
End: 2024-10-30
Payer: MEDICARE

## 2024-11-04 ENCOUNTER — TELEPHONE (OUTPATIENT)
Dept: PRIMARY CARE | Facility: CLINIC | Age: 69
End: 2024-11-04
Payer: MEDICARE

## 2024-11-04 ENCOUNTER — TELEPHONE (OUTPATIENT)
Dept: PRIMARY CARE | Facility: CLINIC | Age: 69
End: 2024-11-04

## 2024-11-13 DIAGNOSIS — E78.5 HYPERLIPIDEMIA, UNSPECIFIED HYPERLIPIDEMIA TYPE: ICD-10-CM

## 2024-11-13 RX ORDER — ROSUVASTATIN CALCIUM 20 MG/1
20 TABLET, COATED ORAL DAILY
Qty: 100 TABLET | Refills: 0 | Status: SHIPPED | OUTPATIENT
Start: 2024-11-13

## 2024-12-24 DIAGNOSIS — E11.9 TYPE 2 DIABETES MELLITUS WITHOUT COMPLICATION, WITHOUT LONG-TERM CURRENT USE OF INSULIN (MULTI): ICD-10-CM

## 2024-12-27 RX ORDER — METFORMIN HYDROCHLORIDE 500 MG/1
TABLET, EXTENDED RELEASE ORAL
Qty: 200 TABLET | Refills: 2 | Status: SHIPPED | OUTPATIENT
Start: 2024-12-27

## 2025-01-07 ENCOUNTER — APPOINTMENT (OUTPATIENT)
Dept: PRIMARY CARE | Facility: CLINIC | Age: 70
End: 2025-01-07
Payer: MEDICARE

## 2025-01-07 ENCOUNTER — LAB (OUTPATIENT)
Dept: LAB | Facility: LAB | Age: 70
End: 2025-01-07
Payer: MEDICARE

## 2025-01-07 VITALS
OXYGEN SATURATION: 97 % | SYSTOLIC BLOOD PRESSURE: 130 MMHG | DIASTOLIC BLOOD PRESSURE: 80 MMHG | WEIGHT: 190 LBS | HEART RATE: 85 BPM | BODY MASS INDEX: 32.44 KG/M2 | HEIGHT: 64 IN

## 2025-01-07 DIAGNOSIS — Z12.5 SCREENING PSA (PROSTATE SPECIFIC ANTIGEN): ICD-10-CM

## 2025-01-07 DIAGNOSIS — I10 HYPERTENSION, UNSPECIFIED TYPE: ICD-10-CM

## 2025-01-07 DIAGNOSIS — Z79.4 TYPE 2 DIABETES MELLITUS WITH OTHER SPECIFIED COMPLICATION, WITH LONG-TERM CURRENT USE OF INSULIN: ICD-10-CM

## 2025-01-07 DIAGNOSIS — E78.49 OTHER HYPERLIPIDEMIA: ICD-10-CM

## 2025-01-07 DIAGNOSIS — Z12.11 ENCOUNTER FOR SCREENING FOR MALIGNANT NEOPLASM OF COLON: ICD-10-CM

## 2025-01-07 DIAGNOSIS — E11.9 TYPE 2 DIABETES MELLITUS WITHOUT COMPLICATION, WITHOUT LONG-TERM CURRENT USE OF INSULIN (MULTI): ICD-10-CM

## 2025-01-07 DIAGNOSIS — Z00.00 ROUTINE GENERAL MEDICAL EXAMINATION AT HEALTH CARE FACILITY: ICD-10-CM

## 2025-01-07 DIAGNOSIS — E11.69 TYPE 2 DIABETES MELLITUS WITH OTHER SPECIFIED COMPLICATION, WITH LONG-TERM CURRENT USE OF INSULIN: ICD-10-CM

## 2025-01-07 DIAGNOSIS — Z00.00 MEDICARE ANNUAL WELLNESS VISIT, SUBSEQUENT: Primary | ICD-10-CM

## 2025-01-07 DIAGNOSIS — Z00.00 MEDICARE ANNUAL WELLNESS VISIT, SUBSEQUENT: ICD-10-CM

## 2025-01-07 LAB
ALBUMIN SERPL BCP-MCNC: 4.6 G/DL (ref 3.4–5)
ALP SERPL-CCNC: 100 U/L (ref 33–136)
ALT SERPL W P-5'-P-CCNC: 33 U/L (ref 10–52)
ANION GAP SERPL CALC-SCNC: 14 MMOL/L (ref 10–20)
AST SERPL W P-5'-P-CCNC: 24 U/L (ref 9–39)
BILIRUB SERPL-MCNC: 0.6 MG/DL (ref 0–1.2)
BUN SERPL-MCNC: 17 MG/DL (ref 6–23)
CALCIUM SERPL-MCNC: 10.3 MG/DL (ref 8.6–10.6)
CHLORIDE SERPL-SCNC: 97 MMOL/L (ref 98–107)
CHOLEST SERPL-MCNC: 159 MG/DL (ref 0–199)
CHOLESTEROL/HDL RATIO: 3.1
CO2 SERPL-SCNC: 31 MMOL/L (ref 21–32)
CREAT SERPL-MCNC: 0.89 MG/DL (ref 0.5–1.3)
CREAT UR-MCNC: 66.1 MG/DL (ref 20–370)
EGFRCR SERPLBLD CKD-EPI 2021: >90 ML/MIN/1.73M*2
ERYTHROCYTE [DISTWIDTH] IN BLOOD BY AUTOMATED COUNT: 13.2 % (ref 11.5–14.5)
EST. AVERAGE GLUCOSE BLD GHB EST-MCNC: 137 MG/DL
GLUCOSE SERPL-MCNC: 119 MG/DL (ref 74–99)
HBA1C MFR BLD: 6.4 %
HCT VFR BLD AUTO: 50.5 % (ref 41–52)
HDLC SERPL-MCNC: 51.8 MG/DL
HGB BLD-MCNC: 16.7 G/DL (ref 13.5–17.5)
LDLC SERPL CALC-MCNC: 49 MG/DL
MCH RBC QN AUTO: 32.2 PG (ref 26–34)
MCHC RBC AUTO-ENTMCNC: 33.1 G/DL (ref 32–36)
MCV RBC AUTO: 98 FL (ref 80–100)
MICROALBUMIN UR-MCNC: 15.8 MG/L
MICROALBUMIN/CREAT UR: 23.9 UG/MG CREAT
NON HDL CHOLESTEROL: 107 MG/DL (ref 0–149)
NRBC BLD-RTO: 0 /100 WBCS (ref 0–0)
PLATELET # BLD AUTO: 275 X10*3/UL (ref 150–450)
POTASSIUM SERPL-SCNC: 4.3 MMOL/L (ref 3.5–5.3)
PROT SERPL-MCNC: 8 G/DL (ref 6.4–8.2)
PSA SERPL-MCNC: 0.83 NG/ML
RBC # BLD AUTO: 5.18 X10*6/UL (ref 4.5–5.9)
SODIUM SERPL-SCNC: 138 MMOL/L (ref 136–145)
TRIGL SERPL-MCNC: 291 MG/DL (ref 0–149)
VLDL: 58 MG/DL (ref 0–40)
WBC # BLD AUTO: 10.8 X10*3/UL (ref 4.4–11.3)

## 2025-01-07 PROCEDURE — G0103 PSA SCREENING: HCPCS

## 2025-01-07 PROCEDURE — 1160F RVW MEDS BY RX/DR IN RCRD: CPT | Performed by: INTERNAL MEDICINE

## 2025-01-07 PROCEDURE — 3075F SYST BP GE 130 - 139MM HG: CPT | Performed by: INTERNAL MEDICINE

## 2025-01-07 PROCEDURE — 80061 LIPID PANEL: CPT

## 2025-01-07 PROCEDURE — 83036 HEMOGLOBIN GLYCOSYLATED A1C: CPT

## 2025-01-07 PROCEDURE — 1170F FXNL STATUS ASSESSED: CPT | Performed by: INTERNAL MEDICINE

## 2025-01-07 PROCEDURE — 3008F BODY MASS INDEX DOCD: CPT | Performed by: INTERNAL MEDICINE

## 2025-01-07 PROCEDURE — 82570 ASSAY OF URINE CREATININE: CPT

## 2025-01-07 PROCEDURE — 85027 COMPLETE CBC AUTOMATED: CPT

## 2025-01-07 PROCEDURE — 1124F ACP DISCUSS-NO DSCNMKR DOCD: CPT | Performed by: INTERNAL MEDICINE

## 2025-01-07 PROCEDURE — G0439 PPPS, SUBSEQ VISIT: HCPCS | Performed by: INTERNAL MEDICINE

## 2025-01-07 PROCEDURE — 82043 UR ALBUMIN QUANTITATIVE: CPT

## 2025-01-07 PROCEDURE — 1036F TOBACCO NON-USER: CPT | Performed by: INTERNAL MEDICINE

## 2025-01-07 PROCEDURE — 80053 COMPREHEN METABOLIC PANEL: CPT

## 2025-01-07 PROCEDURE — 1159F MED LIST DOCD IN RCRD: CPT | Performed by: INTERNAL MEDICINE

## 2025-01-07 PROCEDURE — 3079F DIAST BP 80-89 MM HG: CPT | Performed by: INTERNAL MEDICINE

## 2025-01-07 ASSESSMENT — ACTIVITIES OF DAILY LIVING (ADL)
MANAGING_FINANCES: INDEPENDENT
GROCERY_SHOPPING: INDEPENDENT
DOING_HOUSEWORK: INDEPENDENT
DRESSING: INDEPENDENT
TAKING_MEDICATION: INDEPENDENT
BATHING: INDEPENDENT

## 2025-01-07 ASSESSMENT — PATIENT HEALTH QUESTIONNAIRE - PHQ9
1. LITTLE INTEREST OR PLEASURE IN DOING THINGS: NOT AT ALL
2. FEELING DOWN, DEPRESSED OR HOPELESS: NOT AT ALL
SUM OF ALL RESPONSES TO PHQ9 QUESTIONS 1 AND 2: 0

## 2025-01-07 NOTE — PROGRESS NOTES
"Subjective   Reason for Visit: Augusto Woodward is an 69 y.o. male here for a Medicare Wellness visit.     Past Medical, Surgical, and Family History reviewed and updated in chart.    Reviewed all medications by prescribing practitioner or clinical pharmacist (such as prescriptions, OTCs, herbal therapies and supplements) and documented in the medical record.    HPI  The patient presents for an annual wellness exam.    Patient Care Team:  Ebony Cha MD as PCP - General (Internal Medicine)  Ebony Cha MD as PCP - [employer] PCP     Review of Systems  Negative   Objective   Vitals:  /80   Pulse 85   Ht 1.626 m (5' 4\")   Wt 86.2 kg (190 lb)   SpO2 97%   BMI 32.61 kg/m²       Physical Exam  NAD. Cooperative.  HEENT: WNL  Neck: WNL  Lungs CTA  Heart: RRR  Abdomen: WNL  Rectal/prostate exam: deferred by the patient  Musculoskeletal system: WNL  Neurologic exam: WNL    Assessment & Plan  Medicare annual wellness visit, subsequent    Orders:    Comprehensive metabolic panel; Future    Encounter for screening for malignant neoplasm of colon    Orders:    Colonoscopy Screening; Average Risk Patient; Future    Type 2 diabetes mellitus with other specified complication, with long-term current use of insulin    Orders:    Albumin-Creatinine Ratio, Urine Random; Future    Hemoglobin A1C; Future    Hypertension, unspecified type    Orders:    CBC; Future    Other hyperlipidemia    Orders:    Lipid panel; Future    Screening PSA (prostate specific antigen)    Orders:    Prostate Spec.Ag,Screen; Future    Routine general medical examination at health care facility    Orders:    1 Year Follow Up In [facility] - PCP - Wellness Exam; Future     Discussed and recommended RSV vaccine, COVID booster.  An annual Ophthalmology exam.    Cardiology follow up.    ACP was discussed, the documents will be presented to the office for scanning upon completion.       "

## 2025-01-08 DIAGNOSIS — N43.3 HYDROCELE, UNSPECIFIED HYDROCELE TYPE: Primary | ICD-10-CM

## 2025-01-18 DIAGNOSIS — E78.5 HYPERLIPIDEMIA, UNSPECIFIED HYPERLIPIDEMIA TYPE: ICD-10-CM

## 2025-01-20 RX ORDER — ROSUVASTATIN CALCIUM 20 MG/1
20 TABLET, COATED ORAL DAILY
Qty: 100 TABLET | Refills: 1 | Status: SHIPPED | OUTPATIENT
Start: 2025-01-20

## 2025-01-27 ENCOUNTER — TELEPHONE (OUTPATIENT)
Dept: PRIMARY CARE | Facility: CLINIC | Age: 70
End: 2025-01-27

## 2025-01-27 ENCOUNTER — APPOINTMENT (OUTPATIENT)
Dept: PREADMISSION TESTING | Facility: HOSPITAL | Age: 70
End: 2025-01-27
Payer: MEDICARE

## 2025-02-03 NOTE — H&P (VIEW-ONLY)
CPM/PAT Evaluation       Name: Augusto Woodward (Augusto oWodward)  /Age: 1955/69 y.o.     Visit Type:   In-Person       Chief Complaint: L hydrocele    HPI 68 yo male referred to PAT by Dr Lau for evaluation in advance of his L Hydrocelectomy 25.    See PMH below     Past Medical History:   Diagnosis Date    Arrhythmia     Cataract     Cerebral vascular accident (Multi)     CHF (congestive heart failure)     Colon polyp     COPD (chronic obstructive pulmonary disease) (Multi)     Coronary artery disease     Dysphagia     Glaucoma     Heart disease     Hiatal hernia     Hyperlipidemia     Hypertension     Hyperthyroidism     Irregular heart beat     Myocardial infarction (Multi)     Sleep apnea     Type 2 diabetes mellitus     Vertigo        Past Surgical History:   Procedure Laterality Date    CARDIAC CATHETERIZATION      COLONOSCOPY      CORONARY ARTERY BYPASS GRAFT      UPPER GASTROINTESTINAL ENDOSCOPY         Patient Sexual activity questions deferred to the physician.    Family History   Problem Relation Name Age of Onset    Atrial fibrillation Mother      Other (pacemaker) Mother         No Known Allergies    Medication Documentation Review Audit       Reviewed by Kortney Washington RN (Registered Nurse) on 25 at 1008      Medication Order Taking? Sig Documenting Provider Last Dose Status   albuterol 90 mcg/actuation inhaler [id number] Yes Inhale 2 puffs every 6 hours if needed for wheezing. Historical MD Denice 2/3/2025 Active   apixaban (Eliquis) 5 mg tablet [id number] Yes Take 1 tablet (5 mg) by mouth twice a day. Papa Galdamez MD 2/3/2025 Active     Discontinued 25 0950   brimonidine (AlphaGAN) 0.2 % ophthalmic solution [id number] Yes Administer 1 drop into both eyes 2 times a day. Papa Galdamez MD 2/3/2025 Active   dilTIAZem CD (Cardizem CD) 120 mg 24 hr capsule [id number] Yes Take 1 capsule (120 mg) by mouth once daily. Ebony Cha MD 2/3/2025 Active   furosemide  (Lasix) 20 mg tablet [id number] Yes Take 1 tablet (20 mg) by mouth once daily. Monday, Wednesday, Friday, Saturday Papa Galdamez MD 2/3/2025 Active   latanoprost (Xalatan) 0.005 % ophthalmic solution [id number] Yes Administer 1 drop into both eyes once daily at bedtime. Papa Galdamez MD 2/3/2025 Active   magnesium oxide (Mag-Ox) 400 mg tablet [id number] Yes 1 tablet (400 mg) once daily. Papa Galdamez MD 2/3/2025 Active   metFORMIN  mg 24 hr tablet [id number] Yes TAKE 2 TABLETS BY MOUTH ONCE  DAILY IN THE EVENING WITH A MEAL (DO NOT CRUSH, CHEW, OR SPLIT) Ebony Cha MD 2/3/2025 Active   methIMAzole (Tapazole) 10 mg tablet [id number] Yes Take 1 tablet (10 mg) by mouth once daily. Takes  4 days per week, Monday, Tuesday, Wednesday, Thursday Papa Galdamez MD 2/3/2025 Active   metoprolol succinate XL (Toprol-XL) 100 mg 24 hr tablet [id number] Yes Take 1 tablet (100 mg) by mouth once daily. Do not crush or chew. Papa Galdamez MD 2/3/2025 Active     Discontinued 02/04/25 0954   multivitamin tablet [id number] Yes Take 1 tablet by mouth once daily. Papa Galdamez MD 2/3/2025 Active   rosuvastatin (Crestor) 20 mg tablet [id number] Yes Take 1 tablet (20 mg) by mouth once daily. Ebony Cha MD 2/3/2025 Active   tiotropium (Spiriva with HandiHaler) 18 mcg inhalation capsule [id number] Yes Place 1 capsule (18 mcg) into inhaler and inhale once daily. Ebony Cha MD 2/3/2025 Active   Tradjenta 5 mg tablet [id number] Yes TAKE 1 TABLET BY MOUTH ONCE  DAILY Ebony Cha MD 2/3/2025 Active                      PAT ROS:   Constitutional:   neg    Neuro/Psych:   neg    Eyes:   neg    Ears:   neg    Nose:   neg    Mouth:   neg    Throat:   neg    Neck:   neg    Cardio:   neg    Respiratory:   neg    Endocrine:   neg    GI:   neg    :   neg    Musculoskeletal:   neg    Hematologic:   neg    Skin:  neg        Physical Exam  Vitals and nursing note reviewed. Physical  "exam within normal limits.   Constitutional:       Appearance: Normal appearance.   HENT:      Nose: Nose normal.      Mouth/Throat:      Mouth: Mucous membranes are moist.      Pharynx: Oropharynx is clear.   Eyes:      Extraocular Movements: Extraocular movements intact.      Conjunctiva/sclera: Conjunctivae normal.      Pupils: Pupils are equal, round, and reactive to light.   Cardiovascular:      Rate and Rhythm: Normal rate and regular rhythm.      Pulses: Normal pulses.      Heart sounds: Normal heart sounds.   Pulmonary:      Effort: Pulmonary effort is normal.      Breath sounds: Normal breath sounds.   Abdominal:      General: Bowel sounds are normal.      Palpations: Abdomen is soft.   Musculoskeletal:         General: Normal range of motion.      Cervical back: Normal range of motion and neck supple.   Skin:     General: Skin is warm and dry.      Capillary Refill: Capillary refill takes 2 to 3 seconds.   Neurological:      General: No focal deficit present.      Mental Status: He is alert and oriented to person, place, and time.   Psychiatric:         Mood and Affect: Mood normal.         Behavior: Behavior normal.          PAT AIRWAY:   Airway:     Mallampati::  II    TM distance::  >3 FB    Neck ROM::  Full      Visit Vitals  /75   Pulse 60   Temp 36.4 °C (97.5 °F) (Temporal)   Resp 16   Ht 1.676 m (5' 6\")   Wt 86 kg (189 lb 9.5 oz)   SpO2 93%   BMI 30.60 kg/m²   Smoking Status Never   BSA 2 m²       DASI Risk Score      Flowsheet Row Pre-Admission Testing from 2/4/2025 in [facility]   Can you take care of yourself (eat, dress, bathe, or use toilet)?  2.75 filed at 02/04/2025 1030   Can you walk indoors, such as around your house? 1.75 filed at 02/04/2025 1030   Can you walk a block or two on level ground?  2.75 filed at 02/04/2025 1030   Can you climb a flight of stairs or walk up a hill? 5.5 filed at 02/04/2025 1030   Can you run a short distance? 8 filed at 02/04/2025 1030 "   Can you do light work around the house like dusting or washing dishes? 2.7 filed at 02/04/2025 1030   Can you do moderate work around the house like vacuuming, sweeping floors or carrying groceries? 3.5 filed at 02/04/2025 1030   Can you do heavy work around the house like scrubbing floors or lifting and moving heavy furniture?  8 filed at 02/04/2025 1030   Can you do yard work like raking leaves, weeding or pushing a mower? 4.5 filed at 02/04/2025 1030   Can you have sexual relations? 0 filed at 02/04/2025 1030   Can you participate in moderate recreational activities like golf, bowling, dancing, doubles tennis or throwing a baseball or football? 0 filed at 02/04/2025 1030   Can you participate in strenous sports like swimming, singles tennis, football, basketball, or skiing? 0 filed at 02/04/2025 1030   DASI SCORE 39.45 filed at 02/04/2025 1030   METS Score (Will be calculated only when all the questions are answered) 7.6 filed at 02/04/2025 1030          Caprini DVT Assessment      Flowsheet Row Pre-Admission Testing from 2/4/2025 in [facility]   DVT Score (IF A SCORE IS NOT CALCULATING, MUST SELECT A BMI TO COMPLETE) 8 filed at 02/04/2025 1842   Medical Factors COPD filed at 02/04/2025 1842   Surgical Factors Major surgery planned, including arthroscopic and laproscopic (1-2 hours) filed at 02/04/2025 1842   BMI (BMI MUST BE CHOSEN) 31-40 (Obesity) filed at 02/04/2025 1842          Modified Frailty Index    No data to display       CHADS2 Stroke Risk  Current as of 5 hours ago        12.5% 3 to 100%: High Risk   2 to < 3%: Medium Risk   0 to < 2%: Low Risk     Last Change:           This score determines the patient's risk of having a stroke if the patient has atrial fibrillation.          Points Metrics   1 Has Congestive Heart Failure:  Yes      Patients with congestive heart failure get 1 point.    Current as of 5 hours ago   1 Has Hypertension:  Yes     Patients with hypertension get 1  point.    Current as of 5 hours ago   0 Age:  69     Patients who are 75 years of age or older get 1 point.    Current as of 5 hours ago   1 Has Diabetes Excluding Gestational Diabetes:  Yes     Patients with diabetes get 1 point.    Current as of 5 hours ago   2 Had Stroke:  Yes  Had TIA:  No  Had Thromboembolism:  No     Patients who have had a stroke, TIA, or thromboembolism get 2 points.    Current as of 5 hours ago             Revised Cardiac Risk Index      Flowsheet Row Pre-Admission Testing from 2/4/2025 in [facility]   High-Risk Surgery (Intraperitoneal, Intrathoracic,Suprainguinal vascular) 0 filed at 02/04/2025 1843   History of ischemic heart disease (History of MI, History of positive exercuse test, Current chest paint considered due to myocardial ischemia, Use of nitrate therapy, ECG with pathological Q Waves) 0 filed at 02/04/2025 1843   History of congestive heart failure (pulmonary edemia, bilateral rales or S3 gallop, Paroxysmal nocturnal dyspnea, CXR showing pulmonary vascular redistribution) 0 filed at 02/04/2025 1843   History of cerebrovascular disease (Prior TIA or stroke) 0 filed at 02/04/2025 1843   Pre-operative insulin treatment 0 filed at 02/04/2025 1843   Pre-operative creatinine>2 mg/dl 0 filed at 02/04/2025 1843   Revised Cardiac Risk Calculator 0 filed at 02/04/2025 1843          Apfel Simplified Score      Flowsheet Row Pre-Admission Testing from 2/4/2025 in [facility]   Smoking status 1 filed at 02/04/2025 1843   History of motion sickness or PONV  0 filed at 02/04/2025 1843   Use of postoperative opioids 0 filed at 02/04/2025 1843   Gender - Female 0=No filed at 02/04/2025 1843   Apfel Simplified Score Calculator 1 filed at 02/04/2025 1843          Risk Analysis Index Results This Encounter    No data found in the last 10 encounters.       Stop Bang Score      Flowsheet Row Pre-Admission Testing from 2/4/2025 in [facility]   Do you  snore loudly? 1 filed at 02/04/2025 0956   Do you often feel tired or fatigued after your sleep? 0 filed at 02/04/2025 0956   Has anyone ever observed you stop breathing in your sleep? 0 filed at 02/04/2025 0956   Do you have or are you being treated for high blood pressure? 1 filed at 02/04/2025 0956   Recent BMI (Calculated) 30.6 filed at 02/04/2025 0956   Is BMI greater than 35 kg/m2? 0=No filed at 02/04/2025 0956   Age older than 50 years old? 1=Yes filed at 02/04/2025 0956   Is your neck circumference greater than 17 inches (Male) or 16 inches (Female)? 1 filed at 02/04/2025 0956   Gender - Male 1=Yes filed at 02/04/2025 0956   STOP-BANG Total Score 5 filed at 02/04/2025 0956          Prodigy: High Risk  Total Score: 23              Prodigy Age Score      Prodigy Gender Score     Prodigy CHF score          ARISCAT Score for Postoperative Pulmonary Complications      Flowsheet Row Pre-Admission Testing from 2/4/2025 in [facility]   Age Calculated Score 3 filed at 02/04/2025 1843   Preoperative SpO2 8 filed at 02/04/2025 1843   Respiratory infection in the last month Either upper or lower (i.e., URI, bronchitis, pneumonia), with fever and antibiotic treatment 0 filed at 02/04/2025 1843   Preoperative anemia (Hgb less than 10 g/dl) 0 filed at 02/04/2025 1843   Surgical incision  0 filed at 02/04/2025 1843   Duration of surgery  0 filed at 02/04/2025 1843   Emergency Procedure  0 filed at 02/04/2025 1843   ARISCAT Total Score  11 filed at 02/04/2025 1843          Herrera Perioperative Risk for Myocardial Infarction or Cardiac Arrest (ALMA)      Flowsheet Row Pre-Admission Testing from 2/4/2025 in [facility]   Calculated Age Score 1.38 filed at 02/04/2025 1844   Functional Status  0 filed at 02/04/2025 1844   ASA Class  -1.92 filed at 02/04/2025 1844   Creatinine 0 filed at 02/04/2025 1844   Type of Procedure  -0.26 filed at 02/04/2025 1844   ALMA Total Score  -6.05 filed at  "02/04/2025 1844   ALMA % 0.24 filed at 02/04/2025 1844          Assessment and Plan   68 yo male presents to [facility] for risk assessment and preoperative optimization in advance of his hydrocelectomy    Today his BP is slightly elevated, he is afebrile, his pulse ox is 93% on room air at rest    Neuro:  No diagnosis or significant findings on chart review or clinical presentation and evaluation.   The patient is at an increased risk for post operative delirium secondary to age >/= 65.  Preoperative brain exercise  discussed with patient. The patient is at an increased risk for perioperative stroke secondary age and co morbidities    HEENT/Airway:  Glaucoma- continue eye drops as prescribed     Cardiovascular:  Denies chest pain, shortness of breathe, dizziness, palpations, or lightheadedness    Paroxysmal AF 2023 DCC  Hold Eliquis 3 days before surgery  Continue Metoprolol as prescribed  Continue Cardizem as prescribed    CHF saw Dr Glynn 7/22/21  Furosemide as prescribed but hold day of surgery    H/O MI/CAD s/p 2008 CABG    HLD- continue Crestor as prescribed   Lab Results   Component Value Date    CHOL 159 01/07/2025    CHOL 129 10/17/2023    CHOL 129 04/18/2023     Lab Results   Component Value Date    HDL 51.8 01/07/2025    HDL 48.7 10/17/2023    HDL 44.8 04/18/2023     Lab Results   Component Value Date    LDLCALC 49 01/07/2025    LDLCALC 51 10/17/2023     Lab Results   Component Value Date    TRIG 291 (H) 01/07/2025    TRIG 147 10/17/2023    TRIG 189 (H) 04/18/2023     No components found for: \"CHOLHDL\"      1/29/25 Seen by Cardilogist at [facility] Dr Ahsan Martinez for 6 moth follow up, per his note:  Assessment & Plan  Paroxysmal atrial fibrillation (HCC)  -Has been through atrial flutter  ablation March 18, 2024 with Dr. Bell. Apparently no recurrence since that time.  -Continue Eliquis for CVA prevention   Benign essential HTN  -BP at goal of <130/80.  -Continue current medications.  Mixed " hyperlipidemia  -Counseled on heart healthy Mediterranean diet and exercise. Specifically encouraged more fish,vegetables,whole grains and less red meat in diet.  -LDL at goal of <70mg/dL.  -Continue current medications.      I have faxed Dr Tiarra Foreman [facility] for risk assessment and recommendation of Eliquis recommendations- per wife she states she was told to hold for 3 days    METS are  7.6, RCRI is 0 (3.9% risk for 30 day postoperative MACE)  ALMA indicates a 0.24% risk of intraoperative or 30 day postoperative ALMA  No additional preoperative testing is currently indicated.    EKG - 1/29/25 SR Sever RAD, RBBB, inf. T wave changes    Pulmonary:  MAC uses cpap with nocturnal O2    COPD- continue inhalers as prescribed    PRODIGY: High risk for opioid induced respiratory depression  Discussed smoking cessation and deep breathing handout given    Renal:   No diagnosis or significant findings on chart review, or clinical presentation and evaluation.     Pt at Moderate risk for perioperative CARLOS based on Dynamic Predictive Scoring Tool for Perioperative CARLOS  Lab Results   Component Value Date    GLUCOSE 119 (H) 01/07/2025    CALCIUM 10.3 01/07/2025     01/07/2025    K 4.3 01/07/2025    CO2 31 01/07/2025    CL 97 (L) 01/07/2025    BUN 17 01/07/2025    CREATININE 0.89 01/07/2025       Endocrine:  Hyperthyroidism- continue tapazole as prescribed  Lab Results   Component Value Date    TSH 0.01 (L) 11/24/2023     DM- continue metformin hold on day of surgery  Tradjenta last dose 3 days before surgery     Lab Results   Component Value Date    HGBA1C 6.4 (H) 01/07/2025       Hematologic:  No diagnosis or significant findings on chart review or clinical presentation and evaluation.  Lab Results   Component Value Date    WBC 10.8 01/07/2025    HGB 16.7 01/07/2025    HCT 50.5 01/07/2025    MCV 98 01/07/2025     01/07/2025       CAPRINI SCORE 8    Pt supplied education/VTE handout    Gastrointestinal:   No diagnosis  or significant findings on chart review or clinical presentation and evaluation.   EAT-10 score of 0 - self-perceived oropharyngeal dysphagia scale (0-40)      :  See HPI    Infectious disease:   No diagnosis or significant findings on chart review or clinical presentation and evaluation.     Musculoskeletal:   No diagnosis or significant findings on chart review or clinical presentation and evaluation.   Last dose of NSAIDS 2/9/25  Instructed if  no issues with your liver you may take Tylenol 2-500 mg tablets every 8 hrs around the clock for pain including morning of surgery with a sip of water    Preoperative risk assessment  ASA III  NSQIP - Predicted length of stay days0-1  PAT Testing - none    Anesthesia:  No anesthesia complications    denies dental issues  Smoker-denies  Drugs-denies  ETOH-social   denies personal/family issues with Anesthesia    Face to Face patient contact time 45 ,om    GEOFFREY Loyd 2/4/2025 6:47 PM

## 2025-02-03 NOTE — CPM/PAT H&P
CPM/PAT Evaluation       Name: Augusto Woodward (Augusto Woodward)  /Age: 1955/69 y.o.     Visit Type:   In-Person       Chief Complaint: L hydrocele    HPI 68 yo male referred to PAT by Dr Lau for evaluation in advance of his L Hydrocelectomy 25.    See PMH below     Past Medical History:   Diagnosis Date    Arrhythmia     Cataract     Cerebral vascular accident (Multi)     CHF (congestive heart failure)     Colon polyp     COPD (chronic obstructive pulmonary disease) (Multi)     Coronary artery disease     Dysphagia     Glaucoma     Heart disease     Hiatal hernia     Hyperlipidemia     Hypertension     Hyperthyroidism     Irregular heart beat     Myocardial infarction (Multi)     Sleep apnea     Type 2 diabetes mellitus     Vertigo        Past Surgical History:   Procedure Laterality Date    CARDIAC CATHETERIZATION      COLONOSCOPY      CORONARY ARTERY BYPASS GRAFT      UPPER GASTROINTESTINAL ENDOSCOPY         Patient Sexual activity questions deferred to the physician.    Family History   Problem Relation Name Age of Onset    Atrial fibrillation Mother      Other (pacemaker) Mother         No Known Allergies    Medication Documentation Review Audit       Reviewed by Kortney Washington RN (Registered Nurse) on 25 at 1008      Medication Order Taking? Sig Documenting Provider Last Dose Status   albuterol 90 mcg/actuation inhaler [id number] Yes Inhale 2 puffs every 6 hours if needed for wheezing. Historical MD Denice 2/3/2025 Active   apixaban (Eliquis) 5 mg tablet [id number] Yes Take 1 tablet (5 mg) by mouth twice a day. Papa Galdamez MD 2/3/2025 Active     Discontinued 25 0950   brimonidine (AlphaGAN) 0.2 % ophthalmic solution [id number] Yes Administer 1 drop into both eyes 2 times a day. Papa Galdamez MD 2/3/2025 Active   dilTIAZem CD (Cardizem CD) 120 mg 24 hr capsule [id number] Yes Take 1 capsule (120 mg) by mouth once daily. Ebony Cha MD 2/3/2025 Active   furosemide  (Lasix) 20 mg tablet [id number] Yes Take 1 tablet (20 mg) by mouth once daily. Monday, Wednesday, Friday, Saturday Papa Galdamez MD 2/3/2025 Active   latanoprost (Xalatan) 0.005 % ophthalmic solution [id number] Yes Administer 1 drop into both eyes once daily at bedtime. Papa Galdamez MD 2/3/2025 Active   magnesium oxide (Mag-Ox) 400 mg tablet [id number] Yes 1 tablet (400 mg) once daily. Papa Galdamez MD 2/3/2025 Active   metFORMIN  mg 24 hr tablet [id number] Yes TAKE 2 TABLETS BY MOUTH ONCE  DAILY IN THE EVENING WITH A MEAL (DO NOT CRUSH, CHEW, OR SPLIT) Ebony Cha MD 2/3/2025 Active   methIMAzole (Tapazole) 10 mg tablet [id number] Yes Take 1 tablet (10 mg) by mouth once daily. Takes  4 days per week, Monday, Tuesday, Wednesday, Thursday Papa Galdamez MD 2/3/2025 Active   metoprolol succinate XL (Toprol-XL) 100 mg 24 hr tablet [id number] Yes Take 1 tablet (100 mg) by mouth once daily. Do not crush or chew. Papa Galdamez MD 2/3/2025 Active     Discontinued 02/04/25 0954   multivitamin tablet [id number] Yes Take 1 tablet by mouth once daily. Papa Galdamez MD 2/3/2025 Active   rosuvastatin (Crestor) 20 mg tablet [id number] Yes Take 1 tablet (20 mg) by mouth once daily. Ebony Cha MD 2/3/2025 Active   tiotropium (Spiriva with HandiHaler) 18 mcg inhalation capsule [id number] Yes Place 1 capsule (18 mcg) into inhaler and inhale once daily. Ebony Cha MD 2/3/2025 Active   Tradjenta 5 mg tablet [id number] Yes TAKE 1 TABLET BY MOUTH ONCE  DAILY Ebony Cha MD 2/3/2025 Active                      PAT ROS:   Constitutional:   neg    Neuro/Psych:   neg    Eyes:   neg    Ears:   neg    Nose:   neg    Mouth:   neg    Throat:   neg    Neck:   neg    Cardio:   neg    Respiratory:   neg    Endocrine:   neg    GI:   neg    :   neg    Musculoskeletal:   neg    Hematologic:   neg    Skin:  neg        Physical Exam  Vitals and nursing note reviewed. Physical  "exam within normal limits.   Constitutional:       Appearance: Normal appearance.   HENT:      Nose: Nose normal.      Mouth/Throat:      Mouth: Mucous membranes are moist.      Pharynx: Oropharynx is clear.   Eyes:      Extraocular Movements: Extraocular movements intact.      Conjunctiva/sclera: Conjunctivae normal.      Pupils: Pupils are equal, round, and reactive to light.   Cardiovascular:      Rate and Rhythm: Normal rate and regular rhythm.      Pulses: Normal pulses.      Heart sounds: Normal heart sounds.   Pulmonary:      Effort: Pulmonary effort is normal.      Breath sounds: Normal breath sounds.   Abdominal:      General: Bowel sounds are normal.      Palpations: Abdomen is soft.   Musculoskeletal:         General: Normal range of motion.      Cervical back: Normal range of motion and neck supple.   Skin:     General: Skin is warm and dry.      Capillary Refill: Capillary refill takes 2 to 3 seconds.   Neurological:      General: No focal deficit present.      Mental Status: He is alert and oriented to person, place, and time.   Psychiatric:         Mood and Affect: Mood normal.         Behavior: Behavior normal.          PAT AIRWAY:   Airway:     Mallampati::  II    TM distance::  >3 FB    Neck ROM::  Full      Visit Vitals  /75   Pulse 60   Temp 36.4 °C (97.5 °F) (Temporal)   Resp 16   Ht 1.676 m (5' 6\")   Wt 86 kg (189 lb 9.5 oz)   SpO2 93%   BMI 30.60 kg/m²   Smoking Status Never   BSA 2 m²       DASI Risk Score      Flowsheet Row Pre-Admission Testing from 2/4/2025 in [facility]   Can you take care of yourself (eat, dress, bathe, or use toilet)?  2.75 filed at 02/04/2025 1030   Can you walk indoors, such as around your house? 1.75 filed at 02/04/2025 1030   Can you walk a block or two on level ground?  2.75 filed at 02/04/2025 1030   Can you climb a flight of stairs or walk up a hill? 5.5 filed at 02/04/2025 1030   Can you run a short distance? 8 filed at 02/04/2025 1030 "   Can you do light work around the house like dusting or washing dishes? 2.7 filed at 02/04/2025 1030   Can you do moderate work around the house like vacuuming, sweeping floors or carrying groceries? 3.5 filed at 02/04/2025 1030   Can you do heavy work around the house like scrubbing floors or lifting and moving heavy furniture?  8 filed at 02/04/2025 1030   Can you do yard work like raking leaves, weeding or pushing a mower? 4.5 filed at 02/04/2025 1030   Can you have sexual relations? 0 filed at 02/04/2025 1030   Can you participate in moderate recreational activities like golf, bowling, dancing, doubles tennis or throwing a baseball or football? 0 filed at 02/04/2025 1030   Can you participate in strenous sports like swimming, singles tennis, football, basketball, or skiing? 0 filed at 02/04/2025 1030   DASI SCORE 39.45 filed at 02/04/2025 1030   METS Score (Will be calculated only when all the questions are answered) 7.6 filed at 02/04/2025 1030          Caprini DVT Assessment      Flowsheet Row Pre-Admission Testing from 2/4/2025 in [facility]   DVT Score (IF A SCORE IS NOT CALCULATING, MUST SELECT A BMI TO COMPLETE) 8 filed at 02/04/2025 1842   Medical Factors COPD filed at 02/04/2025 1842   Surgical Factors Major surgery planned, including arthroscopic and laproscopic (1-2 hours) filed at 02/04/2025 1842   BMI (BMI MUST BE CHOSEN) 31-40 (Obesity) filed at 02/04/2025 1842          Modified Frailty Index    No data to display       CHADS2 Stroke Risk  Current as of 5 hours ago        12.5% 3 to 100%: High Risk   2 to < 3%: Medium Risk   0 to < 2%: Low Risk     Last Change:           This score determines the patient's risk of having a stroke if the patient has atrial fibrillation.          Points Metrics   1 Has Congestive Heart Failure:  Yes      Patients with congestive heart failure get 1 point.    Current as of 5 hours ago   1 Has Hypertension:  Yes     Patients with hypertension get 1  point.    Current as of 5 hours ago   0 Age:  69     Patients who are 75 years of age or older get 1 point.    Current as of 5 hours ago   1 Has Diabetes Excluding Gestational Diabetes:  Yes     Patients with diabetes get 1 point.    Current as of 5 hours ago   2 Had Stroke:  Yes  Had TIA:  No  Had Thromboembolism:  No     Patients who have had a stroke, TIA, or thromboembolism get 2 points.    Current as of 5 hours ago             Revised Cardiac Risk Index      Flowsheet Row Pre-Admission Testing from 2/4/2025 in [facility]   High-Risk Surgery (Intraperitoneal, Intrathoracic,Suprainguinal vascular) 0 filed at 02/04/2025 1843   History of ischemic heart disease (History of MI, History of positive exercuse test, Current chest paint considered due to myocardial ischemia, Use of nitrate therapy, ECG with pathological Q Waves) 0 filed at 02/04/2025 1843   History of congestive heart failure (pulmonary edemia, bilateral rales or S3 gallop, Paroxysmal nocturnal dyspnea, CXR showing pulmonary vascular redistribution) 0 filed at 02/04/2025 1843   History of cerebrovascular disease (Prior TIA or stroke) 0 filed at 02/04/2025 1843   Pre-operative insulin treatment 0 filed at 02/04/2025 1843   Pre-operative creatinine>2 mg/dl 0 filed at 02/04/2025 1843   Revised Cardiac Risk Calculator 0 filed at 02/04/2025 1843          Apfel Simplified Score      Flowsheet Row Pre-Admission Testing from 2/4/2025 in [facility]   Smoking status 1 filed at 02/04/2025 1843   History of motion sickness or PONV  0 filed at 02/04/2025 1843   Use of postoperative opioids 0 filed at 02/04/2025 1843   Gender - Female 0=No filed at 02/04/2025 1843   Apfel Simplified Score Calculator 1 filed at 02/04/2025 1843          Risk Analysis Index Results This Encounter    No data found in the last 10 encounters.       Stop Bang Score      Flowsheet Row Pre-Admission Testing from 2/4/2025 in [facility]   Do you  snore loudly? 1 filed at 02/04/2025 0956   Do you often feel tired or fatigued after your sleep? 0 filed at 02/04/2025 0956   Has anyone ever observed you stop breathing in your sleep? 0 filed at 02/04/2025 0956   Do you have or are you being treated for high blood pressure? 1 filed at 02/04/2025 0956   Recent BMI (Calculated) 30.6 filed at 02/04/2025 0956   Is BMI greater than 35 kg/m2? 0=No filed at 02/04/2025 0956   Age older than 50 years old? 1=Yes filed at 02/04/2025 0956   Is your neck circumference greater than 17 inches (Male) or 16 inches (Female)? 1 filed at 02/04/2025 0956   Gender - Male 1=Yes filed at 02/04/2025 0956   STOP-BANG Total Score 5 filed at 02/04/2025 0956          Prodigy: High Risk  Total Score: 23              Prodigy Age Score      Prodigy Gender Score     Prodigy CHF score          ARISCAT Score for Postoperative Pulmonary Complications      Flowsheet Row Pre-Admission Testing from 2/4/2025 in [facility]   Age Calculated Score 3 filed at 02/04/2025 1843   Preoperative SpO2 8 filed at 02/04/2025 1843   Respiratory infection in the last month Either upper or lower (i.e., URI, bronchitis, pneumonia), with fever and antibiotic treatment 0 filed at 02/04/2025 1843   Preoperative anemia (Hgb less than 10 g/dl) 0 filed at 02/04/2025 1843   Surgical incision  0 filed at 02/04/2025 1843   Duration of surgery  0 filed at 02/04/2025 1843   Emergency Procedure  0 filed at 02/04/2025 1843   ARISCAT Total Score  11 filed at 02/04/2025 1843          Herrera Perioperative Risk for Myocardial Infarction or Cardiac Arrest (ALMA)      Flowsheet Row Pre-Admission Testing from 2/4/2025 in [facility]   Calculated Age Score 1.38 filed at 02/04/2025 1844   Functional Status  0 filed at 02/04/2025 1844   ASA Class  -1.92 filed at 02/04/2025 1844   Creatinine 0 filed at 02/04/2025 1844   Type of Procedure  -0.26 filed at 02/04/2025 1844   ALMA Total Score  -6.05 filed at  "02/04/2025 1844   ALMA % 0.24 filed at 02/04/2025 1844          Assessment and Plan   68 yo male presents to [facility] for risk assessment and preoperative optimization in advance of his hydrocelectomy    Today his BP is slightly elevated, he is afebrile, his pulse ox is 93% on room air at rest    Neuro:  No diagnosis or significant findings on chart review or clinical presentation and evaluation.   The patient is at an increased risk for post operative delirium secondary to age >/= 65.  Preoperative brain exercise  discussed with patient. The patient is at an increased risk for perioperative stroke secondary age and co morbidities    HEENT/Airway:  Glaucoma- continue eye drops as prescribed     Cardiovascular:  Denies chest pain, shortness of breathe, dizziness, palpations, or lightheadedness    Paroxysmal AF 2023 DCC  Hold Eliquis 3 days before surgery  Continue Metoprolol as prescribed  Continue Cardizem as prescribed    CHF saw Dr Glynn 7/22/21  Furosemide as prescribed but hold day of surgery    H/O MI/CAD s/p 2008 CABG    HLD- continue Crestor as prescribed   Lab Results   Component Value Date    CHOL 159 01/07/2025    CHOL 129 10/17/2023    CHOL 129 04/18/2023     Lab Results   Component Value Date    HDL 51.8 01/07/2025    HDL 48.7 10/17/2023    HDL 44.8 04/18/2023     Lab Results   Component Value Date    LDLCALC 49 01/07/2025    LDLCALC 51 10/17/2023     Lab Results   Component Value Date    TRIG 291 (H) 01/07/2025    TRIG 147 10/17/2023    TRIG 189 (H) 04/18/2023     No components found for: \"CHOLHDL\"      1/29/25 Seen by Cardilogist at [facility] Dr Ahsan Martinez for 6 moth follow up, per his note:  Assessment & Plan  Paroxysmal atrial fibrillation (HCC)  -Has been through atrial flutter  ablation March 18, 2024 with Dr. Bell. Apparently no recurrence since that time.  -Continue Eliquis for CVA prevention   Benign essential HTN  -BP at goal of <130/80.  -Continue current medications.  Mixed " hyperlipidemia  -Counseled on heart healthy Mediterranean diet and exercise. Specifically encouraged more fish,vegetables,whole grains and less red meat in diet.  -LDL at goal of <70mg/dL.  -Continue current medications.      I have faxed Dr Tiarra Foreman [facility] for risk assessment and recommendation of Eliquis recommendations- per wife she states she was told to hold for 3 days    METS are  7.6, RCRI is 0 (3.9% risk for 30 day postoperative MACE)  ALMA indicates a 0.24% risk of intraoperative or 30 day postoperative ALMA  No additional preoperative testing is currently indicated.    EKG - 1/29/25 SR Sever RAD, RBBB, inf. T wave changes    Pulmonary:  MAC uses cpap with nocturnal O2    COPD- continue inhalers as prescribed    PRODIGY: High risk for opioid induced respiratory depression  Discussed smoking cessation and deep breathing handout given    Renal:   No diagnosis or significant findings on chart review, or clinical presentation and evaluation.     Pt at Moderate risk for perioperative CARLOS based on Dynamic Predictive Scoring Tool for Perioperative CARLOS  Lab Results   Component Value Date    GLUCOSE 119 (H) 01/07/2025    CALCIUM 10.3 01/07/2025     01/07/2025    K 4.3 01/07/2025    CO2 31 01/07/2025    CL 97 (L) 01/07/2025    BUN 17 01/07/2025    CREATININE 0.89 01/07/2025       Endocrine:  Hyperthyroidism- continue tapazole as prescribed  Lab Results   Component Value Date    TSH 0.01 (L) 11/24/2023     DM- continue metformin hold on day of surgery  Tradjenta last dose 3 days before surgery     Lab Results   Component Value Date    HGBA1C 6.4 (H) 01/07/2025       Hematologic:  No diagnosis or significant findings on chart review or clinical presentation and evaluation.  Lab Results   Component Value Date    WBC 10.8 01/07/2025    HGB 16.7 01/07/2025    HCT 50.5 01/07/2025    MCV 98 01/07/2025     01/07/2025       CAPRINI SCORE 8    Pt supplied education/VTE handout    Gastrointestinal:   No diagnosis  or significant findings on chart review or clinical presentation and evaluation.   EAT-10 score of 0 - self-perceived oropharyngeal dysphagia scale (0-40)      :  See HPI    Infectious disease:   No diagnosis or significant findings on chart review or clinical presentation and evaluation.     Musculoskeletal:   No diagnosis or significant findings on chart review or clinical presentation and evaluation.   Last dose of NSAIDS 2/9/25  Instructed if  no issues with your liver you may take Tylenol 2-500 mg tablets every 8 hrs around the clock for pain including morning of surgery with a sip of water    Preoperative risk assessment  ASA III  NSQIP - Predicted length of stay days0-1  PAT Testing - none    Anesthesia:  No anesthesia complications    denies dental issues  Smoker-denies  Drugs-denies  ETOH-social   denies personal/family issues with Anesthesia    Face to Face patient contact time 45 ,om    GEOFFREY Loyd 2/4/2025 6:47 PM

## 2025-02-04 ENCOUNTER — PRE-ADMISSION TESTING (OUTPATIENT)
Dept: PREADMISSION TESTING | Facility: HOSPITAL | Age: 70
End: 2025-02-04
Payer: MEDICARE

## 2025-02-04 VITALS
BODY MASS INDEX: 30.47 KG/M2 | SYSTOLIC BLOOD PRESSURE: 130 MMHG | RESPIRATION RATE: 16 BRPM | HEART RATE: 60 BPM | TEMPERATURE: 97.5 F | OXYGEN SATURATION: 93 % | WEIGHT: 189.6 LBS | HEIGHT: 66 IN | DIASTOLIC BLOOD PRESSURE: 75 MMHG

## 2025-02-04 DIAGNOSIS — N43.3 HYDROCELE, UNSPECIFIED HYDROCELE TYPE: ICD-10-CM

## 2025-02-04 PROCEDURE — 99204 OFFICE O/P NEW MOD 45 MIN: CPT | Performed by: NURSE PRACTITIONER

## 2025-02-04 RX ORDER — METOPROLOL SUCCINATE 100 MG/1
100 TABLET, EXTENDED RELEASE ORAL DAILY
COMMUNITY

## 2025-02-04 RX ORDER — BRIMONIDINE TARTRATE 2 MG/ML
1 SOLUTION/ DROPS OPHTHALMIC 2 TIMES DAILY
COMMUNITY

## 2025-02-04 ASSESSMENT — ENCOUNTER SYMPTOMS
RESPIRATORY NEGATIVE: 1
NEUROLOGICAL NEGATIVE: 1
ENDOCRINE NEGATIVE: 1
MUSCULOSKELETAL NEGATIVE: 1
CARDIOVASCULAR NEGATIVE: 1
GASTROINTESTINAL NEGATIVE: 1
NECK NEGATIVE: 1
CONSTITUTIONAL NEGATIVE: 1
EYES NEGATIVE: 1

## 2025-02-04 ASSESSMENT — PAIN - FUNCTIONAL ASSESSMENT: PAIN_FUNCTIONAL_ASSESSMENT: 0-10

## 2025-02-04 ASSESSMENT — DUKE ACTIVITY SCORE INDEX (DASI)
DASI METS SCORE: 7.6
CAN YOU DO HEAVY WORK AROUND THE HOUSE LIKE SCRUBBING FLOORS OR LIFTING AND MOVING HEAVY FURNITURE: YES
CAN YOU DO LIGHT WORK AROUND THE HOUSE LIKE DUSTING OR WASHING DISHES: YES
TOTAL_SCORE: 39.45
CAN YOU DO YARD WORK LIKE RAKING LEAVES, WEEDING OR PUSHING A MOWER: YES
CAN YOU WALK INDOORS, SUCH AS AROUND YOUR HOUSE: YES
CAN YOU CLIMB A FLIGHT OF STAIRS OR WALK UP A HILL: YES
CAN YOU HAVE SEXUAL RELATIONS: NO
CAN YOU TAKE CARE OF YOURSELF (EAT, DRESS, BATHE, OR USE TOILET): YES
CAN YOU WALK A BLOCK OR TWO ON LEVEL GROUND: YES
CAN YOU DO MODERATE WORK AROUND THE HOUSE LIKE VACUUMING, SWEEPING FLOORS OR CARRYING GROCERIES: YES
CAN YOU PARTICIPATE IN MODERATE RECREATIONAL ACTIVITIES LIKE GOLF, BOWLING, DANCING, DOUBLES TENNIS OR THROWING A BASEBALL OR FOOTBALL: NO
CAN YOU RUN A SHORT DISTANCE: YES
CAN YOU PARTICIPATE IN STRENOUS SPORTS LIKE SWIMMING, SINGLES TENNIS, FOOTBALL, BASKETBALL, OR SKIING: NO

## 2025-02-04 ASSESSMENT — PAIN SCALES - GENERAL: PAINLEVEL_OUTOF10: 0 - NO PAIN

## 2025-02-04 ASSESSMENT — LIFESTYLE VARIABLES: SMOKING_STATUS: NONSMOKER

## 2025-02-04 NOTE — PREPROCEDURE INSTRUCTIONS
Medication List            Accurate as of February 4, 2025 10:13 AM. Always use your most recent med list.                albuterol 90 mcg/actuation inhaler  Medication Adjustments for Surgery: Take/Use as prescribed  Notes to patient: BRING ON DAY OF SURGERY     apixaban 5 mg tablet  Commonly known as: Eliquis  Medication Adjustments for Surgery: Take last dose 3 days before surgery  Notes to patient: Last dose 2/13/25     brimonidine 0.2 % ophthalmic solution  Commonly known as: AlphaGAN  Medication Adjustments for Surgery: Take/Use as prescribed     dilTIAZem  mg 24 hr capsule  Commonly known as: Cardizem CD  Medication Adjustments for Surgery: Take/Use as prescribed     furosemide 20 mg tablet  Commonly known as: Lasix  Medication Adjustments for Surgery: Do Not take on the morning of surgery     latanoprost 0.005 % ophthalmic solution  Commonly known as: Xalatan  Medication Adjustments for Surgery: Take/Use as prescribed     magnesium oxide 400 mg tablet  Commonly known as: Mag-Ox  Additional Medication Adjustments for Surgery: Other (Comment)  Notes to patient: Last dose 2/9/25     metFORMIN  mg 24 hr tablet  Commonly known as: Glucophage-XR  TAKE 2 TABLETS BY MOUTH ONCE  DAILY IN THE EVENING WITH A MEAL (DO NOT CRUSH, CHEW, OR SPLIT)  Medication Adjustments for Surgery: Do Not take on the morning of surgery     methIMAzole 10 mg tablet  Commonly known as: Tapazole  Medication Adjustments for Surgery: Take/Use as prescribed     metoprolol succinate  mg 24 hr tablet  Commonly known as: Toprol-XL  Medication Adjustments for Surgery: Take/Use as prescribed     multivitamin tablet  Additional Medication Adjustments for Surgery: Take last dose 7 days before surgery  Notes to patient: Last dose 2/9/24     rosuvastatin 20 mg tablet  Commonly known as: Crestor  Take 1 tablet (20 mg) by mouth once daily.  Medication Adjustments for Surgery: Take/Use as prescribed     tiotropium 18 mcg inhalation  capsule  Commonly known as: Spiriva with HandiHaler  Place 1 capsule (18 mcg) into inhaler and inhale once daily.  Medication Adjustments for Surgery: Take/Use as prescribed     Tradjenta 5 mg tablet  Generic drug: linaGLIPtin  TAKE 1 TABLET BY MOUTH ONCE  DAILY  Medication Adjustments for Surgery: Take last dose 3 days before surgery  Notes to patient: Last dose 2/13/25            NPO Instructions:     Do not eat any food after midnight the night before your surgery/procedure.  You may have clear liquids until TWO hours before surgery/procedure. This includes water, black tea/coffee, (no milk or cream) apple juice and electrolyte drinks (Gatorade).  You may chew gum up to TWO hours before your surgery/procedure.     Additional Instructions:      Seven/Six Days before Surgery:  Review your medication instructions, stop indicated medications  Five Days before Surgery:  Review your medication instructions, stop indicated medications  Three Days before Surgery:  Review your medication instructions, stop indicated medications  The Day before Surgery:  No smoking or alcohol use 24 hours before surgery  Review your medication instructions, stop indicated medications  You will be contacted regarding the time of your arrival to facility and surgery time  Do not eat any food after Midnight  Day of Surgery:  Review your medication instructions, take indicated medications  If you have diabetes, please check your fasting blood sugar upon awakening.  If fasting blood sugar is <80 mg/dl, drink 100 ml of apple juice, time limit of 2 hours before  You may have clear liquids until TWO hours before surgery/procedure.  This includes water, black tea/coffee, (no milk or cream) apple juice and electrolyte drinks (Gatorade)  You may chew gum up to TWO hours before your surgery/procedure  Wear  comfortable loose fitting clothing  Do not use moisturizers, creams, lotions or perfume  All jewelry and valuables should be left at home      CONTACT SURGEON'S OFFICE IF YOU DEVELOP:  * Fever = 100.4 F   * New respiratory symptoms (e.g. cough, shortness of breath, respiratory distress, sore throat)  * Recent loss of taste or smell  *Flu like symptoms such as headache, fatigue or gastrointestinal symptoms  * You develop any open sores, shingles, burning or painful urination   AND/OR:  * You no longer wish to have the surgery.  * Any other personal circumstances change that may lead to the need to cancel or defer this surgery.  *You were admitted to any hospital within one week of your planned procedure.     SMOKING:  *Quitting smoking can make a huge difference to your health and recovery from surgery.    *If you need help with quitting, call 2-209-QUIT-NOW.     THE DAY BEFORE SURGERY:  *Do not eat any food after midnight the night before your surgery.   *You may have up to TEN OUNCES of clear liquids until TWO hours before your instructed ARRIVAL TIME to hospital. This includes water, black tea/coffee, (no milk or cream) apple juice, clear broth and electrolyte drinks (Gatorade). Please avoid clear liquids that are red in color.   *You may chew gum/mints up to TWO hours before your surgery/procedure.     SURGICAL TIME:  *You will be contacted between 2 p.m. and 3 p.m. the business day before your surgery with your arrival time.  *If you haven't received a call by 3pm, call [phone]  *Scheduled surgery times may change and you will be notified if this occurs-check your personal voicemail for any updates.     ON THE MORNING OF SURGERY:  *Wear comfortable, loose fitting clothing.   *Do not use moisturizers, creams, lotions or perfume.  *All jewelry and valuables should be left at home.  *Prosthetic devices such as contact lenses, hearing aids, dentures, eyelash extensions, hairpins and body piercing must be removed before surgery.     BRING WITH YOU:  *Photo ID and insurance card  *Current list of medications and  allergies  *Pacemaker/Defibrillator/Heart stent cards  *CPAP machine and mask  *Slings/splints/crutches  *Copy of your complete Advanced Directive/DHPOA-if applicable  *Neurostimulator implant remote     PARKING AND ARRIVAL:  *Check in at the Main Entrance desk and let them know you are here for surgery.     IF YOU ARE HAVING OUTPATIENT/SAME DAY SURGERY:  *A responsible adult MUST accompany you at the time of discharge and stay with you for 24 hours after your surgery.  *You may NOT drive yourself home after surgery.  *You may use a taxi or ride sharing service ([employer], Uber) to return home ONLY if you are accompanied by a friend or family member.  *Instructions for resuming your medications will be provided by your surgeon.     Thank you for coming to Pre Admission testing.      If I have prescribe medication please don't forget to  at your pharmacy.      Any questions about today's visit call [phone] and leave a message in the general mailbox.     Patient instructed to ambulate as soon as possible postoperatively to decrease thromboembolic risk.     Luis Shields, APRN-CNP     Thank you for visiting [facility].  If you have any changes to your health condition, please call the surgeons office to alert them and give them details of your symptoms.        Preoperative Fasting Guidelines     Why must I stop eating and drinking near surgery time?  With sedation, food or liquid in your stomach can enter your lungs causing serious complications  Increases nausea and vomiting     When do I need to stop eating and drinking before my surgery?  Do not eat any food after midnight the night before your surgery/procedure.  You may have up to TEN ounces of clear liquid until TWO hours before your instructed arrival time to the hospital.  This includes water, black tea/coffee, (no milk or cream) apple juice, and electrolyte drinks (Gatorade)  You may chew gum until TWO hours before your  surgery/procedure        Additional Instructions:      The Day before Surgery:  -Review your medication instructions, stop indicated medications  -You will be contacted in the evening regarding the time of your arrival to facility and surgery time     Day of Surgery:  -Review your medication instructions, take indicated medications  -Wear comfortable loose fitting clothing  -Do not use moisturizers, creams, lotions or perfume  -All jewelry and valuables should be left at home              Preoperative Brain Exercises     What are brain exercises?  A brain exercise is any activity that engages your thinking (cognitive) skills.     What types of activities are considered brain exercises?  Jigsaw puzzles, crossword puzzles, word jumble, memory games, word search, and many more.  Many can be found free online or on your phone via a mobile ivory.     Why should I do brain exercises before my surgery?  More recent research has shown brain exercise before surgery can lower the risk of postoperative delirium (confusion) which can be especially important for older adults.  Patients who did brain exercises for 5 to 10 minutes a day the days before surgery, cut their risk of postoperative delirium in half up to 1 week after surgery.                 [facility]     Preoperative Deep Breathing Exercises     Why it is important to do deep breathing exercises before my surgery?  Deep breathing exercises strengthen your breathing muscles.  This helps you to recover after your surgery and decreases the chance of breathing complications.      How are the deep breathing exercises done?  Sit straight with your back supported.  Breathe in deeply and slowly through your nose. Your lower rib cage should expand and your abdomen may move forward.  Hold that breath for 3 to 5 seconds.  Breathe out through pursed lips, slowly and completely.  Rest and repeat 10 times every hour while awake.  Rest longer if you become  dizzy or lightheaded.                      [facility]  Preoperative Deep Breathing Exercises     Why it is important to do deep breathing exercises before my surgery?  Deep breathing exercises strengthen your breathing muscles.  This helps you to recover after your surgery and decreases the chance of breathing complications.        How are the deep breathing exercises done?  Sit straight with your back supported.  Breathe in deeply and slowly through your nose. Your lower rib cage should expand and your abdomen may move forward.  Hold that breath for 3 to 5 seconds.  Breathe out through pursed lips, slowly and completely.  Rest and repeat 10 times every hour while awake.  Rest longer if you become dizzy or lightheaded.        Patient Information: Incentive Spirometer  What is an incentive spirometer?  An incentive spirometer is a device used before and after surgery to “exercise” your lungs.  It helps you to take deeper breaths to expand your lungs.  Below is an example of a basic incentive spirometer.  The device you receive may differ slightly but they all function the same.    Why do I need to use an incentive spirometer?  Using your incentive spirometer prepares your lungs for surgery and helps prevent lung problems after surgery.  How do I use my incentive spirometer?  When you're using your incentive spirometer, make sure to breathe through your mouth. If you breathe through your nose, the incentive spirometer won't work properly. You can hold your nose if you have trouble.  If you feel dizzy at any time, stop and rest. Try again at a later time.  Follow the steps below:  Set up your incentive spirometer, expand the flexible tubing and connect to the outlet.  Sit upright in a chair or bed. Hold the incentive spirometer at eye level.   Put the mouthpiece in your mouth and close your lips tightly around it. Slowly breathe out (exhale) completely.  Breathe in (inhale) slowly through  your mouth as deeply as you can. As you take a breath, you will see the piston rise inside the large column. While the piston rises, the indicator should move upwards. It should stay in between the 2 arrows (see Figure).  Try to get the piston as high as you can, while keeping the indicator between the arrows.   If the indicator doesn't stay between the arrows, you're breathing either too fast or too slow.  When you get it as high as you can, hold your breath for 10 seconds, or as long as possible. While you're holding your breath, the piston will slowly fall to the base of the spirometer.  Once the piston reaches the bottom of the spirometer, breathe out slowly through your mouth. Rest for a few seconds.  Repeat 10 times. Try to get the piston to the same level with each breath.  Repeat every hour while awake  You can carefully clean the outside of the mouthpiece with an alcohol wipe or soap and water.       Patient and Family Education        Ways You Can Help Prevent Blood Clots               This handout explains some simple things you can do to help prevent blood clots.      Blood clots are blockages that can form in the body's veins. When a blood clot forms in your deep veins, it may be called a deep vein thrombosis, or DVT for short. Blood clots can happen in any part of the body where blood flows, but they are most common in the arms and legs. If a piece of a blood clot breaks free and travels to the lungs, it is called a pulmonary embolus (PE). A PE can be a very serious problem.         Being in the hospital or having surgery can raise your chances of getting a blood clot because you may not be well enough to move around as much as you normally do.         Ways you can help prevent blood clots in the hospital           Wearing SCDs. SCDs stands for Sequential Compression Devices.   SCDs are special sleeves that wrap around your legs  They attach to a pump that fills them with air to gently squeeze your  legs every few minutes.   This helps return the blood in your legs to your heart.   SCDs should only be taken off when walking or bathing.   SCDs may not be comfortable, but they can help save your life.                                            Wearing compression stockings - if your doctor orders them. These special snug fitting stockings gently squeeze your legs to help blood flow.       Walking. Walking helps move the blood in your legs.   If your doctor says it is ok, try walking the halls at least   5 times a day. Ask us to help you get up, so you don't fall.      Taking any blood thinning medicines your doctor orders.        Page 1 of 2            [facility]; 3/23   Ways you can help prevent blood clots at home         Wearing compression stockings - if your doctor orders them. ? Walking - to help move the blood in your legs.       Taking any blood thinning medicines your doctor orders.      Signs of a blood clot or PE        Tell your doctor or nurse know right away if you have of the problems listed below.    If you are at home, seek medical care right away. Call 911 for chest pain or problems breathing.                Signs of a blood clot (DVT) - such as pain,  swelling, redness or warmth in your arm or leg      Signs of a pulmonary embolism (PE) - such as chest pain or feeling short of breath  JENNA Loyd-CNP  Thank you for visiting the [facility].  If you have any changes to your health condition, please call the surgeons office to alert them and give them details of your symptoms.

## 2025-02-17 ENCOUNTER — ANESTHESIA (OUTPATIENT)
Dept: OPERATING ROOM | Facility: HOSPITAL | Age: 70
End: 2025-02-17
Payer: MEDICARE

## 2025-02-17 ENCOUNTER — HOSPITAL ENCOUNTER (OUTPATIENT)
Facility: HOSPITAL | Age: 70
Setting detail: OUTPATIENT SURGERY
Discharge: HOME | End: 2025-02-17
Attending: UROLOGY | Admitting: UROLOGY
Payer: MEDICARE

## 2025-02-17 ENCOUNTER — ANESTHESIA EVENT (OUTPATIENT)
Dept: OPERATING ROOM | Facility: HOSPITAL | Age: 70
End: 2025-02-17
Payer: MEDICARE

## 2025-02-17 VITALS
HEIGHT: 66 IN | RESPIRATION RATE: 16 BRPM | OXYGEN SATURATION: 94 % | SYSTOLIC BLOOD PRESSURE: 137 MMHG | TEMPERATURE: 97.3 F | BODY MASS INDEX: 31.32 KG/M2 | DIASTOLIC BLOOD PRESSURE: 68 MMHG | HEART RATE: 60 BPM | WEIGHT: 194.89 LBS

## 2025-02-17 DIAGNOSIS — N43.3 LEFT HYDROCELE: Primary | ICD-10-CM

## 2025-02-17 LAB — GLUCOSE BLD MANUAL STRIP-MCNC: 119 MG/DL (ref 74–99)

## 2025-02-17 PROCEDURE — 3600000007 HC OR TIME - EACH INCREMENTAL 1 MINUTE - PROCEDURE LEVEL TWO: Performed by: UROLOGY

## 2025-02-17 PROCEDURE — 0751T DGTZ GLS MCRSCP SLD LEVEL II: CPT | Mod: TC,SUR,BEALAB | Performed by: UROLOGY

## 2025-02-17 PROCEDURE — 82947 ASSAY GLUCOSE BLOOD QUANT: CPT

## 2025-02-17 PROCEDURE — 7100000009 HC PHASE TWO TIME - INITIAL BASE CHARGE: Performed by: UROLOGY

## 2025-02-17 PROCEDURE — 7100000001 HC RECOVERY ROOM TIME - INITIAL BASE CHARGE: Performed by: UROLOGY

## 2025-02-17 PROCEDURE — 3600000002 HC OR TIME - INITIAL BASE CHARGE - PROCEDURE LEVEL TWO: Performed by: UROLOGY

## 2025-02-17 PROCEDURE — 2720000007 HC OR 272 NO HCPCS: Performed by: UROLOGY

## 2025-02-17 PROCEDURE — 2500000004 HC RX 250 GENERAL PHARMACY W/ HCPCS (ALT 636 FOR OP/ED): Performed by: UROLOGY

## 2025-02-17 PROCEDURE — 7100000010 HC PHASE TWO TIME - EACH INCREMENTAL 1 MINUTE: Performed by: UROLOGY

## 2025-02-17 PROCEDURE — 3700000002 HC GENERAL ANESTHESIA TIME - EACH INCREMENTAL 1 MINUTE: Performed by: UROLOGY

## 2025-02-17 PROCEDURE — 2500000005 HC RX 250 GENERAL PHARMACY W/O HCPCS

## 2025-02-17 PROCEDURE — A55040 PR REMOVAL OF HYDROCELE,TUNICA,UNILAT

## 2025-02-17 PROCEDURE — 2500000004 HC RX 250 GENERAL PHARMACY W/ HCPCS (ALT 636 FOR OP/ED)

## 2025-02-17 PROCEDURE — 3700000001 HC GENERAL ANESTHESIA TIME - INITIAL BASE CHARGE: Performed by: UROLOGY

## 2025-02-17 PROCEDURE — A55040 PR REMOVAL OF HYDROCELE,TUNICA,UNILAT: Performed by: ANESTHESIOLOGY

## 2025-02-17 PROCEDURE — 7100000002 HC RECOVERY ROOM TIME - EACH INCREMENTAL 1 MINUTE: Performed by: UROLOGY

## 2025-02-17 RX ORDER — OXYCODONE HYDROCHLORIDE 5 MG/1
5 TABLET ORAL EVERY 4 HOURS PRN
Status: DISCONTINUED | OUTPATIENT
Start: 2025-02-17 | End: 2025-02-17 | Stop reason: HOSPADM

## 2025-02-17 RX ORDER — OXYCODONE AND ACETAMINOPHEN 5; 325 MG/1; MG/1
1 TABLET ORAL EVERY 6 HOURS PRN
Qty: 12 TABLET | Refills: 0 | Status: SHIPPED | OUTPATIENT
Start: 2025-02-17 | End: 2025-02-20

## 2025-02-17 RX ORDER — GLYCOPYRROLATE 0.2 MG/ML
INJECTION INTRAMUSCULAR; INTRAVENOUS AS NEEDED
Status: DISCONTINUED | OUTPATIENT
Start: 2025-02-17 | End: 2025-02-17

## 2025-02-17 RX ORDER — LABETALOL HYDROCHLORIDE 5 MG/ML
5 INJECTION, SOLUTION INTRAVENOUS ONCE AS NEEDED
Status: DISCONTINUED | OUTPATIENT
Start: 2025-02-17 | End: 2025-02-17 | Stop reason: HOSPADM

## 2025-02-17 RX ORDER — FENTANYL CITRATE 50 UG/ML
50 INJECTION, SOLUTION INTRAMUSCULAR; INTRAVENOUS EVERY 5 MIN PRN
Status: DISCONTINUED | OUTPATIENT
Start: 2025-02-17 | End: 2025-02-17 | Stop reason: HOSPADM

## 2025-02-17 RX ORDER — LIDOCAINE HYDROCHLORIDE 10 MG/ML
INJECTION, SOLUTION INFILTRATION; PERINEURAL AS NEEDED
Status: DISCONTINUED | OUTPATIENT
Start: 2025-02-17 | End: 2025-02-17

## 2025-02-17 RX ORDER — MIDAZOLAM HYDROCHLORIDE 1 MG/ML
INJECTION, SOLUTION INTRAMUSCULAR; INTRAVENOUS AS NEEDED
Status: DISCONTINUED | OUTPATIENT
Start: 2025-02-17 | End: 2025-02-17

## 2025-02-17 RX ORDER — CEFAZOLIN SODIUM 2 G/100ML
2 INJECTION, SOLUTION INTRAVENOUS ONCE
Status: COMPLETED | OUTPATIENT
Start: 2025-02-17 | End: 2025-02-17

## 2025-02-17 RX ORDER — PHENYLEPHRINE HCL IN 0.9% NACL 1 MG/10 ML
SYRINGE (ML) INTRAVENOUS AS NEEDED
Status: DISCONTINUED | OUTPATIENT
Start: 2025-02-17 | End: 2025-02-17

## 2025-02-17 RX ORDER — LIDOCAINE HYDROCHLORIDE 10 MG/ML
0.1 INJECTION, SOLUTION EPIDURAL; INFILTRATION; INTRACAUDAL; PERINEURAL ONCE
Status: DISCONTINUED | OUTPATIENT
Start: 2025-02-17 | End: 2025-02-17 | Stop reason: HOSPADM

## 2025-02-17 RX ORDER — ONDANSETRON HYDROCHLORIDE 2 MG/ML
4 INJECTION, SOLUTION INTRAVENOUS ONCE AS NEEDED
Status: DISCONTINUED | OUTPATIENT
Start: 2025-02-17 | End: 2025-02-17 | Stop reason: HOSPADM

## 2025-02-17 RX ORDER — MEPERIDINE HYDROCHLORIDE 25 MG/ML
12.5 INJECTION INTRAMUSCULAR; INTRAVENOUS; SUBCUTANEOUS EVERY 10 MIN PRN
Status: DISCONTINUED | OUTPATIENT
Start: 2025-02-17 | End: 2025-02-17 | Stop reason: HOSPADM

## 2025-02-17 RX ORDER — KETOROLAC TROMETHAMINE 30 MG/ML
INJECTION, SOLUTION INTRAMUSCULAR; INTRAVENOUS AS NEEDED
Status: DISCONTINUED | OUTPATIENT
Start: 2025-02-17 | End: 2025-02-17

## 2025-02-17 RX ORDER — FENTANYL CITRATE 50 UG/ML
25 INJECTION, SOLUTION INTRAMUSCULAR; INTRAVENOUS EVERY 5 MIN PRN
Status: DISCONTINUED | OUTPATIENT
Start: 2025-02-17 | End: 2025-02-17 | Stop reason: HOSPADM

## 2025-02-17 RX ORDER — PROPOFOL 10 MG/ML
INJECTION, EMULSION INTRAVENOUS AS NEEDED
Status: DISCONTINUED | OUTPATIENT
Start: 2025-02-17 | End: 2025-02-17

## 2025-02-17 RX ORDER — ONDANSETRON HYDROCHLORIDE 2 MG/ML
INJECTION, SOLUTION INTRAVENOUS AS NEEDED
Status: DISCONTINUED | OUTPATIENT
Start: 2025-02-17 | End: 2025-02-17

## 2025-02-17 RX ORDER — FENTANYL CITRATE 50 UG/ML
INJECTION, SOLUTION INTRAMUSCULAR; INTRAVENOUS AS NEEDED
Status: DISCONTINUED | OUTPATIENT
Start: 2025-02-17 | End: 2025-02-17

## 2025-02-17 RX ADMIN — LIDOCAINE HYDROCHLORIDE 50 MG: 10 INJECTION, SOLUTION INFILTRATION; PERINEURAL at 07:18

## 2025-02-17 RX ADMIN — SODIUM CHLORIDE, POTASSIUM CHLORIDE, SODIUM LACTATE AND CALCIUM CHLORIDE: 600; 310; 30; 20 INJECTION, SOLUTION INTRAVENOUS at 07:13

## 2025-02-17 RX ADMIN — Medication 100 MCG: at 07:54

## 2025-02-17 RX ADMIN — ONDANSETRON 4 MG: 2 INJECTION, SOLUTION INTRAMUSCULAR; INTRAVENOUS at 07:58

## 2025-02-17 RX ADMIN — KETOROLAC TROMETHAMINE 15 MG: 30 INJECTION, SOLUTION INTRAMUSCULAR; INTRAVENOUS at 07:58

## 2025-02-17 RX ADMIN — GLYCOPYRROLATE 0.2 MG: 0.2 INJECTION INTRAMUSCULAR; INTRAVENOUS at 07:36

## 2025-02-17 RX ADMIN — EPHEDRINE SULFATE 10 MG: 50 INJECTION, SOLUTION INTRAVENOUS at 07:53

## 2025-02-17 RX ADMIN — FENTANYL CITRATE 25 MCG: 50 INJECTION INTRAMUSCULAR; INTRAVENOUS at 08:17

## 2025-02-17 RX ADMIN — PROPOFOL 50 MG: 10 INJECTION, EMULSION INTRAVENOUS at 07:20

## 2025-02-17 RX ADMIN — MIDAZOLAM 1 MG: 1 INJECTION INTRAMUSCULAR; INTRAVENOUS at 07:13

## 2025-02-17 RX ADMIN — PROPOFOL 150 MG: 10 INJECTION, EMULSION INTRAVENOUS at 07:18

## 2025-02-17 RX ADMIN — EPHEDRINE SULFATE 10 MG: 50 INJECTION, SOLUTION INTRAVENOUS at 07:42

## 2025-02-17 RX ADMIN — Medication 150 MCG: at 07:26

## 2025-02-17 RX ADMIN — EPHEDRINE SULFATE 5 MG: 50 INJECTION, SOLUTION INTRAVENOUS at 07:50

## 2025-02-17 RX ADMIN — DEXAMETHASONE SODIUM PHOSPHATE 4 MG: 4 INJECTION, SOLUTION INTRAMUSCULAR; INTRAVENOUS at 07:35

## 2025-02-17 RX ADMIN — EPHEDRINE SULFATE 10 MG: 50 INJECTION, SOLUTION INTRAVENOUS at 07:47

## 2025-02-17 RX ADMIN — CEFAZOLIN SODIUM 2 G: 2 INJECTION, SOLUTION INTRAVENOUS at 07:22

## 2025-02-17 RX ADMIN — FENTANYL CITRATE 50 MCG: 50 INJECTION INTRAMUSCULAR; INTRAVENOUS at 07:33

## 2025-02-17 RX ADMIN — FENTANYL CITRATE 25 MCG: 50 INJECTION INTRAMUSCULAR; INTRAVENOUS at 07:46

## 2025-02-17 ASSESSMENT — COLUMBIA-SUICIDE SEVERITY RATING SCALE - C-SSRS
1. IN THE PAST MONTH, HAVE YOU WISHED YOU WERE DEAD OR WISHED YOU COULD GO TO SLEEP AND NOT WAKE UP?: NO
6. HAVE YOU EVER DONE ANYTHING, STARTED TO DO ANYTHING, OR PREPARED TO DO ANYTHING TO END YOUR LIFE?: NO
2. HAVE YOU ACTUALLY HAD ANY THOUGHTS OF KILLING YOURSELF?: NO

## 2025-02-17 ASSESSMENT — PAIN - FUNCTIONAL ASSESSMENT
PAIN_FUNCTIONAL_ASSESSMENT: 0-10

## 2025-02-17 ASSESSMENT — PAIN SCALES - GENERAL
PAINLEVEL_OUTOF10: 2
PAINLEVEL_OUTOF10: 3
PAINLEVEL_OUTOF10: 2
PAINLEVEL_OUTOF10: 0 - NO PAIN

## 2025-02-17 NOTE — ANESTHESIA PROCEDURE NOTES
Airway  Date/Time: 2/17/2025 7:22 AM  Urgency: elective    Airway not difficult    Staffing  Performed: LEO   Authorized by: Angelito Sanchez MD    Performed by: LEO Velasco  Patient location during procedure: OR    Indications and Patient Condition  Indications for airway management: anesthesia  Spontaneous Ventilation: absent  Sedation level: deep  Preoxygenated: yes  Mask difficulty assessment: 3 - difficult mask (inadequate, unstable or two providers) +/- NMBA (Two-person BMV without oral airway)    Final Airway Details  Final airway type: supraglottic airway      Successful airway: classic  Size 4     Number of attempts at approach: 1

## 2025-02-17 NOTE — PERIOPERATIVE NURSING NOTE
Uneventful recovery, pt meets criteria for PH2 and DC planning.  Alert, tolerating PO, VS baseline.

## 2025-02-17 NOTE — OP NOTE
HYDROCELECTOMY ( Kefzol 2 grams  Pt's wife can be reached at her work [phone] PT is aware to hold eliquis ) (L) Operative Note     Date: 2025  OR Location: [facility]    Name: Augusto Woodward, : 1955, Age: 69 y.o., MRN: [id number], Sex: male    Diagnosis  Pre-op Diagnosis      * Left hydrocele [N43.3] Post-op Diagnosis     * Left hydrocele [N43.3]     Procedures  HYDROCELECTOMY ( Kefzol 2 grams  Pt's wife can be reached at her work [phone] PT is aware to hold eliquis )  [address] - MS EXCISION HYDROCELE UNILATERAL      Surgeons      * Lana Lau - Primary    Resident/Fellow/Other Assistant:  Surgeons and Role:  * No surgeons found with a matching role *    Staff:   Circulator: Yolanda Mary Person: Mauricio    Anesthesia Staff: Anesthesiologist: Angelito Sanchez MD  C-AA: LEO Velasco    Procedure Summary  Anesthesia: General  ASA: III  Estimated Blood Loss: 2mL  Intra-op Medications:   Administrations occurring from 0700 to 0835 on 25:   Medication Name Total Dose   dexAMETHasone (Decadron) injection 4 mg/mL 4 mg   ePHEDrine injection 35 mg   fentaNYL PF 0.05 mg/mL 75 mcg   glycopyrrolate (Robinul) injection 0.2 mg/mL 0.2 mg   ketorolac (Toradol) 30 mg 15 mg   LR bolus Cannot be calculated   lidocaine (Xylocaine) 1 % 50 mg   midazolam (Versed) 1 mg/1 mL 1 mg   ondansetron (Zofran) 2 mg/mL injection 4 mg   phenylephrine 100 mcg/mL syringe 10 mL (prefilled) 250 mcg   propofol (Diprivan) injection 10 mg/mL 200 mg   ceFAZolin (Ancef) 2 g in dextrose (iso)  mL 2 g              Anesthesia Record               Intraprocedure I/O Totals          Intake    ceFAZolin (Ancef) 2 g in dextrose (iso)  mL 100.00 mL    Total Intake 100 mL          Specimen:   ID Type Source Tests Collected by Time   1 : LEFT HEMATOCELE Tissue HYDROCELE SAC LEFT SURGICAL PATHOLOGY EXAM Lana Lau MD 2025 [id number]                 Drains and/or Catheters: * None in log *    Tourniquet Times:          Implants:     Findings: fluid within sac is consistent w old blood, nodularity and hematoma of back of sac    Indications: Augusto Woodward is an 69 y.o. male who is having surgery for Left hydrocele [N43.3].      The patient was seen in the preoperative area. The risks, benefits, complications, treatment options, non-operative alternatives, expected recovery and outcomes were discussed with the patient. The possibilities of reaction to medication, pulmonary aspiration, injury to surrounding structures, bleeding, recurrent infection, the need for additional procedures, failure to diagnose a condition, and creating a complication requiring transfusion or operation were discussed with the patient. The patient concurred with the proposed plan, giving informed consent.  The site of surgery was properly noted/marked if necessary per policy. The patient has been actively warmed in preoperative area. Preoperative antibiotics have been ordered and given within 1 hours of incision. Venous thrombosis prophylaxis have been ordered including bilateral sequential compression devices    Procedure Details: After induction of anesthesia, pt placed in supione posiotion and prepped and draped in standard fashion.  The left hydrocele was isolated manually and a transverse incision made with a 15 blade.  The electrocautery was used on dartos muscle to develop the next plane of disection and the hydrocele sac was identified.  Uon opening the sac, blood tinged efflux was removed approx 200 ml.  The sac and testis was delivered out of the scrotum and attention was given to dissecting the thick walled hematocele sac off the tesits and cord.  This was sent to pathology and electro cautery uysed for hemostasis.  The edge of sac was then closed with a 2-0 vicryl.  This was hemostatic.  The testis was delivered in f6drdmc, orientatioin back into scrotum and a 2 layer closure with 3-0 vicryl was performed.  This was well approximated and  hemostatic.  Dressing was applied and then awakened  Complications:  None; patient tolerated the procedure well.    Disposition: PACU - hemodynamically stable.  Condition: stable                 Additional Details:      Attending Attestation: I performed the procedure.    Lana Lau  Phone Number: [phone]

## 2025-02-17 NOTE — ANESTHESIA PREPROCEDURE EVALUATION
Patient: Augusto Woodward    Procedure Information       Date/Time: 02/17/25 0700    Procedure: HYDROCELECTOMY ( Kefzol 2 grams  Pt's wife can be reached at her work [phone] PT is aware to hold eliquis ) (Left) - Left Hydrocele    Location: [facility] OR 01 / [facility]    Surgeons: Lana Lau MD            Relevant Problems   Cardiac   (+) Atrial fibrillation (Multi)   (+) CAD (coronary artery disease) of artery bypass graft   (+) HTN (hypertension)   (+) Other hyperlipidemia      Pulmonary   (+) Chronic obstructive pulmonary disease, unspecified COPD type (Multi)   (+) MAC (obstructive sleep apnea)      Endocrine   (+) Type 2 diabetes mellitus with other specified complication, with long-term current use of insulin   (+) Type 2 diabetes mellitus without complication, without long-term current use of insulin (Multi)       Clinical information reviewed:   Tobacco  Allergies  Meds   Med Hx  Surg Hx   Fam Hx  Soc Hx        NPO Detail:  NPO/Void Status  Carbohydrate Drink Given Prior to Surgery? : N  Date of Last Liquid: 02/17/25  Time of Last Liquid: 0500 (0500 10 oz water)  Date of Last Solid: 02/16/25  Time of Last Solid: 2100  Last Intake Type: Clear fluids; Food  Time of Last Void: 0530         Physical Exam    Airway  Mallampati: III  TM distance: >3 FB  Neck ROM: full     Cardiovascular    Dental    Pulmonary    Abdominal            Anesthesia Plan    History of general anesthesia?: yes  History of complications of general anesthesia?: no    ASA 3     general     intravenous induction   Anesthetic plan and risks discussed with patient.    Plan discussed with CAA.

## 2025-02-17 NOTE — ANESTHESIA POSTPROCEDURE EVALUATION
Patient: Augusto Woodward    Procedure Summary       Date: 02/17/25 Room / Location: [facility] / [facility]    Anesthesia Start: 0712 Anesthesia Stop: 0817    Procedure: HYDROCELECTOMY ( Kefzol 2 grams  Pt's wife can be reached at her work [phone] PT is aware to hold eliquis ) (Left) Diagnosis:       Left hydrocele      (Left hydrocele [N43.3])    Surgeons: Lana Lau MD Responsible Provider: Angelito Sanchez MD    Anesthesia Type: general ASA Status: 3            Anesthesia Type: general    Vitals Value Taken Time   /68 02/17/25 0825   Temp 36.3 °C (97.3 °F) 02/17/25 0809   Pulse 72 02/17/25 0825   Resp 18 02/17/25 0825   SpO2 93 % 02/17/25 0825       Anesthesia Post Evaluation    Patient location during evaluation: PACU  Patient participation: complete - patient participated  Level of consciousness: awake  Pain management: adequate  Airway patency: patent  Cardiovascular status: acceptable  Respiratory status: acceptable  Hydration status: acceptable  Postoperative Nausea and Vomiting: none        No notable events documented.

## 2025-02-17 NOTE — DISCHARGE SUMMARY
Discharge Diagnosis  Left hydrocele    Issues Requiring Follow-Up  Bleeding from wound, excessive    Test Results Pending At Discharge  Pending Labs       Order Current Status    Surgical Pathology Exam Collected (02/17/25 0803)            Hospital Course   Tolerated well    Pertinent Physical Exam At Time of Discharge  Physical Exam  As admit reflects  Home Medications     Medication List      START taking these medications     oxyCODONE-acetaminophen 5-325 mg tablet; Commonly known as: Percocet;   Take 1 tablet by mouth every 6 hours if needed for severe pain (7 - 10)   for up to 3 days.     CONTINUE taking these medications     albuterol 90 mcg/actuation inhaler   apixaban 5 mg tablet; Commonly known as: Eliquis   brimonidine 0.2 % ophthalmic solution; Commonly known as: AlphaGAN   dilTIAZem  mg 24 hr capsule; Commonly known as: Cardizem CD   furosemide 20 mg tablet; Commonly known as: Lasix   latanoprost 0.005 % ophthalmic solution; Commonly known as: Xalatan   magnesium oxide 400 mg tablet; Commonly known as: Mag-Ox   metFORMIN  mg 24 hr tablet; Commonly known as: Glucophage-XR; TAKE   2 TABLETS BY MOUTH ONCE  DAILY IN THE EVENING WITH A MEAL (DO NOT CRUSH,   CHEW, OR SPLIT)   methIMAzole 10 mg tablet; Commonly known as: Tapazole   metoprolol succinate  mg 24 hr tablet; Commonly known as:   Toprol-XL   multivitamin tablet   rosuvastatin 20 mg tablet; Commonly known as: Crestor; Take 1 tablet (20   mg) by mouth once daily.   tiotropium 18 mcg inhalation capsule; Commonly known as: Spiriva with   HandiHaler; Place 1 capsule (18 mcg) into inhaler and inhale once daily.   Tradjenta 5 mg tablet; Generic drug: linaGLIPtin; TAKE 1 TABLET BY MOUTH   ONCE  DAILY       Outpatient Follow-Up  Future Appointments   Date Time Provider Department Center   9/24/2025  9:30 AM Claudio Walker MD [id number]       Lana Lau MD

## 2025-02-25 LAB
LABORATORY COMMENT REPORT: NORMAL
PATH REPORT.FINAL DX SPEC: NORMAL
PATH REPORT.GROSS SPEC: NORMAL
PATH REPORT.RELEVANT HX SPEC: NORMAL
PATH REPORT.TOTAL CANCER: NORMAL

## 2025-04-09 DIAGNOSIS — E11.69 TYPE 2 DIABETES MELLITUS WITH OTHER SPECIFIED COMPLICATION, WITH LONG-TERM CURRENT USE OF INSULIN: ICD-10-CM

## 2025-04-09 DIAGNOSIS — Z79.4 TYPE 2 DIABETES MELLITUS WITH OTHER SPECIFIED COMPLICATION, WITH LONG-TERM CURRENT USE OF INSULIN: ICD-10-CM

## 2025-04-11 DIAGNOSIS — J44.9 CHRONIC OBSTRUCTIVE PULMONARY DISEASE, UNSPECIFIED COPD TYPE (MULTI): ICD-10-CM

## 2025-04-11 RX ORDER — LINAGLIPTIN 5 MG/1
5 TABLET, FILM COATED ORAL DAILY
Qty: 100 TABLET | Refills: 1 | Status: SHIPPED | OUTPATIENT
Start: 2025-04-11

## 2025-04-13 RX ORDER — TIOTROPIUM BROMIDE 18 UG/1
1 CAPSULE ORAL; RESPIRATORY (INHALATION)
Qty: 90 CAPSULE | Refills: 2 | Status: SHIPPED | OUTPATIENT
Start: 2025-04-13

## 2025-06-12 ENCOUNTER — PATIENT MESSAGE (OUTPATIENT)
Dept: SLEEP MEDICINE | Facility: HOSPITAL | Age: 70
End: 2025-06-12
Payer: MEDICARE

## 2025-07-15 DIAGNOSIS — E78.5 HYPERLIPIDEMIA, UNSPECIFIED HYPERLIPIDEMIA TYPE: ICD-10-CM

## 2025-07-16 ENCOUNTER — APPOINTMENT (OUTPATIENT)
Dept: PRIMARY CARE | Facility: CLINIC | Age: 70
End: 2025-07-16
Payer: MEDICARE

## 2025-07-16 VITALS
DIASTOLIC BLOOD PRESSURE: 70 MMHG | WEIGHT: 196 LBS | SYSTOLIC BLOOD PRESSURE: 122 MMHG | BODY MASS INDEX: 31.65 KG/M2 | HEART RATE: 67 BPM | OXYGEN SATURATION: 97 %

## 2025-07-16 DIAGNOSIS — J44.9 CHRONIC OBSTRUCTIVE PULMONARY DISEASE, UNSPECIFIED COPD TYPE (MULTI): ICD-10-CM

## 2025-07-16 DIAGNOSIS — E11.69 TYPE 2 DIABETES MELLITUS WITH OTHER SPECIFIED COMPLICATION, WITH LONG-TERM CURRENT USE OF INSULIN: Primary | ICD-10-CM

## 2025-07-16 DIAGNOSIS — Z28.39 INCOMPLETE IMMUNIZATION STATUS: ICD-10-CM

## 2025-07-16 DIAGNOSIS — Z79.4 TYPE 2 DIABETES MELLITUS WITH OTHER SPECIFIED COMPLICATION, WITH LONG-TERM CURRENT USE OF INSULIN: Primary | ICD-10-CM

## 2025-07-16 DIAGNOSIS — I10 HYPERTENSION, UNSPECIFIED TYPE: ICD-10-CM

## 2025-07-16 PROCEDURE — 1159F MED LIST DOCD IN RCRD: CPT | Performed by: INTERNAL MEDICINE

## 2025-07-16 PROCEDURE — 99214 OFFICE O/P EST MOD 30 MIN: CPT | Performed by: INTERNAL MEDICINE

## 2025-07-16 PROCEDURE — 1160F RVW MEDS BY RX/DR IN RCRD: CPT | Performed by: INTERNAL MEDICINE

## 2025-07-16 PROCEDURE — 3074F SYST BP LT 130 MM HG: CPT | Performed by: INTERNAL MEDICINE

## 2025-07-16 PROCEDURE — 3078F DIAST BP <80 MM HG: CPT | Performed by: INTERNAL MEDICINE

## 2025-07-16 PROCEDURE — 1036F TOBACCO NON-USER: CPT | Performed by: INTERNAL MEDICINE

## 2025-07-16 RX ORDER — ROSUVASTATIN CALCIUM 20 MG/1
20 TABLET, COATED ORAL DAILY
Qty: 100 TABLET | Refills: 2 | Status: SHIPPED | OUTPATIENT
Start: 2025-07-16

## 2025-07-16 RX ORDER — ALBUTEROL SULFATE 90 UG/1
2 INHALANT RESPIRATORY (INHALATION) EVERY 6 HOURS PRN
Qty: 18 G | Refills: 3 | Status: SHIPPED | OUTPATIENT
Start: 2025-07-16

## 2025-07-16 ASSESSMENT — PATIENT HEALTH QUESTIONNAIRE - PHQ9
SUM OF ALL RESPONSES TO PHQ9 QUESTIONS 1 AND 2: 0
2. FEELING DOWN, DEPRESSED OR HOPELESS: NOT AT ALL
1. LITTLE INTEREST OR PLEASURE IN DOING THINGS: NOT AT ALL

## 2025-07-16 NOTE — PROGRESS NOTES
Subjective   Patient ID: Augusto Woodward is a 70 y.o. male who presents for Follow-up (Patient here for follow-up visit).    HPI   The patient presents to the office for blood pressure monitoring, COPD, DM follow up, the current medication regimen is tolerated well, refill request.  Review of Systems  Chronic SOB  Objective   /70   Pulse 67   Wt 88.9 kg (196 lb)   SpO2 97%   BMI 31.65 kg/m²     Physical Exam  NAD. Cooperative.  Lungs CTA  Heart: RRR  LE: negative     Assessment/Plan   Diagnoses and all orders for this visit:  Type 2 diabetes mellitus with other specified complication, with long-term current use of insulin  -     Hemoglobin A1C; Future  Incomplete immunization status  -     Measles ([place]) Antibody, IgG; Future  Hypertension, unspecified type  Chronic obstructive pulmonary disease, unspecified COPD type (Multi)  -     Referral to Clinical Pharmacy; Future  -     albuterol 90 mcg/actuation inhaler; Inhale 2 puffs every 6 hours if needed for wheezing.    Reviewed medical records from 1/25 to 6/21, discussed with the patient.

## 2025-07-17 LAB
EST. AVERAGE GLUCOSE BLD GHB EST-MCNC: 160 MG/DL
EST. AVERAGE GLUCOSE BLD GHB EST-SCNC: 8.9 MMOL/L
HBA1C MFR BLD: 7.2 %
MEV IGG SER IA-ACNC: >300 AU/ML

## 2025-09-24 ENCOUNTER — APPOINTMENT (OUTPATIENT)
Dept: SLEEP MEDICINE | Facility: CLINIC | Age: 70
End: 2025-09-24
Payer: MEDICARE

## 2025-09-25 ENCOUNTER — APPOINTMENT (OUTPATIENT)
Dept: SLEEP MEDICINE | Facility: HOSPITAL | Age: 70
End: 2025-09-25
Payer: MEDICARE

## 2025-10-03 ENCOUNTER — APPOINTMENT (OUTPATIENT)
Dept: SLEEP MEDICINE | Facility: CLINIC | Age: 70
End: 2025-10-03
Payer: MEDICARE

## 2026-01-16 ENCOUNTER — APPOINTMENT (OUTPATIENT)
Dept: PRIMARY CARE | Facility: CLINIC | Age: 71
End: 2026-01-16
Payer: MEDICARE

## (undated) DEVICE — SUTURE, VICRYL, 3-0, 27 IN, SH

## (undated) DEVICE — SOLUTION, IRRIGATION, X RX SODIUM CHL 0.9%, 1000ML BTL

## (undated) DEVICE — GLOVE, SURGICAL, PROTEXIS MICRO, 7.0, PF, LATEX

## (undated) DEVICE — Device

## (undated) DEVICE — SUTURE, VICRYL, 2-0, 27 IN, SH, UNDYED

## (undated) DEVICE — SPONGE, GAUZE, AVANT, STERILE, NONWOVEN, 4PLY, 4 X 4, STANDARD

## (undated) DEVICE — DRAPE PACK, BASIC VI, AURORA, 50 X 90IN

## (undated) DEVICE — PREP TRAY, SKIN, DRY, W/GLOVES

## (undated) DEVICE — GOWN, SURGICAL, SIRUS, NON REINFORCED, LARGE